# Patient Record
Sex: FEMALE | Race: WHITE | Employment: OTHER | URBAN - METROPOLITAN AREA
[De-identification: names, ages, dates, MRNs, and addresses within clinical notes are randomized per-mention and may not be internally consistent; named-entity substitution may affect disease eponyms.]

---

## 2017-05-11 ENCOUNTER — HOSPITAL ENCOUNTER (OUTPATIENT)
Dept: MAMMOGRAPHY | Age: 72
Discharge: HOME | End: 2017-05-11
Payer: MEDICARE

## 2017-05-11 DIAGNOSIS — Z13.9 SCREENING: ICD-10-CM

## 2017-05-11 PROCEDURE — 77063 BREAST TOMOSYNTHESIS BI: CPT

## 2018-05-16 ENCOUNTER — HOSPITAL ENCOUNTER (OUTPATIENT)
Dept: MAMMOGRAPHY | Age: 73
Discharge: HOME | End: 2018-05-18
Payer: MEDICARE

## 2018-05-16 DIAGNOSIS — Z12.31 ENCOUNTER FOR SCREENING MAMMOGRAM FOR BREAST CANCER: ICD-10-CM

## 2018-05-16 PROCEDURE — 77063 BREAST TOMOSYNTHESIS BI: CPT

## 2019-05-17 ENCOUNTER — HOSPITAL ENCOUNTER (OUTPATIENT)
Dept: MAMMOGRAPHY | Age: 74
Discharge: HOME | End: 2019-05-19
Payer: MEDICARE

## 2019-05-17 DIAGNOSIS — C50.911 MALIGNANT NEOPLASM OF RIGHT FEMALE BREAST, UNSPECIFIED ESTROGEN RECEPTOR STATUS, UNSPECIFIED SITE OF BREAST (HCC): ICD-10-CM

## 2019-05-17 DIAGNOSIS — Z12.31 ENCOUNTER FOR SCREENING MAMMOGRAM FOR MALIGNANT NEOPLASM OF BREAST: ICD-10-CM

## 2019-05-17 PROCEDURE — 77063 BREAST TOMOSYNTHESIS BI: CPT

## 2019-05-29 ENCOUNTER — HOSPITAL ENCOUNTER (OUTPATIENT)
Dept: MAMMOGRAPHY | Age: 74
Discharge: HOME | End: 2019-05-31
Payer: MEDICARE

## 2019-05-29 DIAGNOSIS — R92.1 BREAST CALCIFICATION, LEFT: ICD-10-CM

## 2019-05-29 PROCEDURE — G0279 TOMOSYNTHESIS, MAMMO: HCPCS

## 2019-06-05 ENCOUNTER — HOSPITAL ENCOUNTER (OUTPATIENT)
Dept: MAMMOGRAPHY | Age: 74
Discharge: HOME | End: 2019-06-07
Payer: MEDICARE

## 2019-06-05 VITALS — DIASTOLIC BLOOD PRESSURE: 73 MMHG | HEART RATE: 70 BPM | SYSTOLIC BLOOD PRESSURE: 118 MMHG

## 2019-06-05 DIAGNOSIS — R92.8 ABNORMAL MAMMOGRAM: ICD-10-CM

## 2019-06-05 DIAGNOSIS — Z98.890 STATUS POST LEFT BREAST BIOPSY: ICD-10-CM

## 2019-06-05 PROCEDURE — 19081 BX BREAST 1ST LESION STRTCTC: CPT

## 2019-06-05 PROCEDURE — 88360 TUMOR IMMUNOHISTOCHEM/MANUAL: CPT

## 2019-06-05 PROCEDURE — 88305 TISSUE EXAM BY PATHOLOGIST: CPT

## 2019-06-05 PROCEDURE — 77065 DX MAMMO INCL CAD UNI: CPT

## 2019-06-07 LAB — SURGICAL PATHOLOGY REPORT: NORMAL

## 2019-07-05 ENCOUNTER — HOSPITAL ENCOUNTER (OUTPATIENT)
Dept: PREADMISSION TESTING | Age: 74
Discharge: HOME | End: 2019-07-09
Payer: MEDICARE

## 2019-07-05 VITALS
OXYGEN SATURATION: 96 % | HEART RATE: 59 BPM | HEIGHT: 62 IN | WEIGHT: 190.04 LBS | RESPIRATION RATE: 16 BRPM | BODY MASS INDEX: 34.97 KG/M2

## 2019-07-05 LAB
ABSOLUTE EOS #: 0.18 K/UL (ref 0–0.44)
ABSOLUTE IMMATURE GRANULOCYTE: 0.02 K/UL (ref 0–0.3)
ABSOLUTE LYMPH #: 1.32 K/UL (ref 1.1–3.7)
ABSOLUTE MONO #: 0.42 K/UL (ref 0.1–1.2)
ANION GAP SERPL CALCULATED.3IONS-SCNC: 10 MMOL/L (ref 9–17)
BASOPHILS # BLD: 1 % (ref 0–2)
BASOPHILS ABSOLUTE: 0.04 K/UL (ref 0–0.2)
BUN BLDV-MCNC: 18 MG/DL (ref 8–23)
CHLORIDE BLD-SCNC: 103 MMOL/L (ref 98–107)
CO2: 27 MMOL/L (ref 20–31)
CREAT SERPL-MCNC: 0.82 MG/DL (ref 0.5–0.9)
DIFFERENTIAL TYPE: NORMAL
EOSINOPHILS RELATIVE PERCENT: 4 % (ref 1–4)
GFR AFRICAN AMERICAN: >60 ML/MIN
GFR NON-AFRICAN AMERICAN: >60 ML/MIN
GFR SERPL CREATININE-BSD FRML MDRD: NORMAL ML/MIN/{1.73_M2}
GFR SERPL CREATININE-BSD FRML MDRD: NORMAL ML/MIN/{1.73_M2}
HCT VFR BLD CALC: 42.2 % (ref 36.3–47.1)
HEMOGLOBIN: 13.4 G/DL (ref 11.9–15.1)
IMMATURE GRANULOCYTES: 0 %
LYMPHOCYTES # BLD: 28 % (ref 24–43)
MCH RBC QN AUTO: 29.9 PG (ref 25.2–33.5)
MCHC RBC AUTO-ENTMCNC: 31.8 G/DL (ref 28.4–34.8)
MCV RBC AUTO: 94.2 FL (ref 82.6–102.9)
MONOCYTES # BLD: 9 % (ref 3–12)
NRBC AUTOMATED: 0 PER 100 WBC
PDW BLD-RTO: 13.5 % (ref 11.8–14.4)
PLATELET # BLD: 212 K/UL (ref 138–453)
PLATELET ESTIMATE: NORMAL
PMV BLD AUTO: 11.1 FL (ref 8.1–13.5)
POTASSIUM SERPL-SCNC: 4.5 MMOL/L (ref 3.7–5.3)
RBC # BLD: 4.48 M/UL (ref 3.95–5.11)
RBC # BLD: NORMAL 10*6/UL
SEG NEUTROPHILS: 58 % (ref 36–65)
SEGMENTED NEUTROPHILS ABSOLUTE COUNT: 2.69 K/UL (ref 1.5–8.1)
SODIUM BLD-SCNC: 140 MMOL/L (ref 135–144)
WBC # BLD: 4.7 K/UL (ref 3.5–11.3)
WBC # BLD: NORMAL 10*3/UL

## 2019-07-05 PROCEDURE — 82565 ASSAY OF CREATININE: CPT

## 2019-07-05 PROCEDURE — 85025 COMPLETE CBC W/AUTO DIFF WBC: CPT

## 2019-07-05 PROCEDURE — 80051 ELECTROLYTE PANEL: CPT

## 2019-07-05 PROCEDURE — 84520 ASSAY OF UREA NITROGEN: CPT

## 2019-07-05 PROCEDURE — 36415 COLL VENOUS BLD VENIPUNCTURE: CPT

## 2019-07-05 RX ORDER — OMEPRAZOLE 20 MG/1
20 CAPSULE, DELAYED RELEASE ORAL DAILY
COMMUNITY

## 2019-07-05 RX ORDER — M-VIT,TX,IRON,MINS/CALC/FOLIC 27MG-0.4MG
1 TABLET ORAL DAILY
COMMUNITY

## 2019-07-05 RX ORDER — ASPIRIN 81 MG/1
162 TABLET ORAL NIGHTLY
COMMUNITY

## 2019-07-05 RX ORDER — PROPAFENONE HYDROCHLORIDE 150 MG/1
150 TABLET, FILM COATED ORAL EVERY 8 HOURS
COMMUNITY

## 2019-07-05 RX ORDER — AMOXICILLIN 500 MG
1 CAPSULE ORAL DAILY
COMMUNITY

## 2019-07-05 SDOH — HEALTH STABILITY: MENTAL HEALTH: HOW OFTEN DO YOU HAVE A DRINK CONTAINING ALCOHOL?: NEVER

## 2019-07-05 NOTE — PRE-PROCEDURE INSTRUCTIONS
permission from your physician. You will need to shower at home the night before surgery and the morning of surgery with a special soap called chlorhexidine gluconate (CHG*). *Not to be used by people allergic to Chlorhexidine Gluconate (CHG). Following these instructions will help you be sure that your skin is clean before surgery. Instructions on cleaning your skin before surgery: The night before your surgery:      You will need to shower with warm water (not hot) and the CHG soap.  Use a clean wash cloth and a clean towel. Have clean clothes available to put on after the shower.    First wash your hair with regular shampoo. Rinse your hair and body thoroughly to remove the shampoo. [facility] Wash your face with your regular soap or water only. Thoroughly rinse your body with warm water from the neck down.  Turn water off to prevent rinsing the soap off too soon.  With a clean wet washcloth and half of the CHG soap in the bottle, lather your entire body from the neck down. Do not use CHG soap near your eyes or ears to avoid injury to those areas.  Wash thoroughly, paying special attention to the area where your surgery will be performed.  Wash your body gently for five (5) minutes. Avoid scrubbing your skin too hard.  Turn the water back on and rinse your body thoroughly.  Pat yourself dry with a clean, soft towel. Do not apply lotion, cream or powder.  Dress with clean freshly washed clothes. The morning of surgery:     Repeat shower following steps above - using remaining half of CHG soap in bottle. Patient Instructions:    [facility] If you are having any type of anesthesia you are to have nothing to eat or drink after midnight the night before your surgery. This includes gum, mints, water or smoking or chewing tobacco.  The only exception to this is a small sip of water to take with any morning dose of heart, blood pressure, or seizure medications.   No alcoholic beverages for 24 hours prior to surgery.  Bring your eyeglasses and case with you. No contacts are to be worn the day of surgery. You also may bring your hearing aids.  If you are on C-PAP or Bi-PAP at home and plan on staying in the hospital overnight for your surgery please bring the machine with you. · Do not wear any jewelry or body piercings day of surgery. Also, NO lotion, perfume or deodorant to be used the day of surgery. No nail polish on the operative extremity (arm/leg surgeries)    ·   If you are staying overnight with us, please bring a small bag of personal items.  Please wear loose, comfortable clothing. If you are potentially going to have a cast or brace bring clothing that will fit over them.  In case of illness - If you have cold or flu like symptoms (high fever, runny nose, sore throat, cough, etc.) rash, nausea, vomiting, loose stools, and/or recent contact with someone who has a contagious disease (chicken pox, measles, etc.) Please call your doctor before coming to the hospital.     If your child is having surgery please make arrangements for any other children to be cared for at home on the day of surgery. Other children are not permitted in recovery room and we want you to be able to spend time with the patient. If other arrangements are not available then we suggest that you have a second adult to stay in the waiting room. Day of Surgery/Procedure:    As a patient at [facility] you can expect quality medical and nursing care that is centered on your individual needs. Our goal is to make your surgical experience as comfortable as possible    . Transportation After Your Surgery/Procedure: You will need a friend or family member to drive you home after your procedure.   Your  must be 25years of age or older and able to

## 2019-07-10 NOTE — PROGRESS NOTES
Dunia Benites from Dr. Dave Ferrer office called and stated that patient can have her IV placed in the left hand for for her Left breast Lumpectomy.

## 2019-07-15 ENCOUNTER — ANESTHESIA (OUTPATIENT)
Dept: OPERATING ROOM | Age: 74
End: 2019-07-15
Payer: MEDICARE

## 2019-07-15 ENCOUNTER — HOSPITAL ENCOUNTER (OUTPATIENT)
Dept: MAMMOGRAPHY | Age: 74
Discharge: HOME | End: 2019-07-17
Payer: MEDICARE

## 2019-07-15 ENCOUNTER — ANESTHESIA EVENT (OUTPATIENT)
Dept: OPERATING ROOM | Age: 74
End: 2019-07-15
Payer: MEDICARE

## 2019-07-15 ENCOUNTER — APPOINTMENT (OUTPATIENT)
Dept: MAMMOGRAPHY | Age: 74
End: 2019-07-15
Attending: SURGERY
Payer: MEDICARE

## 2019-07-15 ENCOUNTER — HOSPITAL ENCOUNTER (OUTPATIENT)
Age: 74
Setting detail: OUTPATIENT SURGERY
Discharge: HOME | End: 2019-07-15
Attending: SURGERY | Admitting: SURGERY
Payer: MEDICARE

## 2019-07-15 VITALS
HEART RATE: 64 BPM | DIASTOLIC BLOOD PRESSURE: 70 MMHG | TEMPERATURE: 98.2 F | BODY MASS INDEX: 35.32 KG/M2 | RESPIRATION RATE: 23 BRPM | WEIGHT: 190 LBS | SYSTOLIC BLOOD PRESSURE: 158 MMHG | OXYGEN SATURATION: 95 %

## 2019-07-15 VITALS — OXYGEN SATURATION: 99 % | TEMPERATURE: 97.3 F | DIASTOLIC BLOOD PRESSURE: 63 MMHG | SYSTOLIC BLOOD PRESSURE: 116 MMHG

## 2019-07-15 DIAGNOSIS — D05.12 DUCTAL CARCINOMA IN SITU OF LEFT BREAST: ICD-10-CM

## 2019-07-15 PROCEDURE — 7100000010 HC PHASE II RECOVERY - FIRST 15 MIN: Performed by: SURGERY

## 2019-07-15 PROCEDURE — 2720000010 HC SURG SUPPLY STERILE: Performed by: SURGERY

## 2019-07-15 PROCEDURE — 6360000002 HC RX W HCPCS: Performed by: NURSE ANESTHETIST, CERTIFIED REGISTERED

## 2019-07-15 PROCEDURE — 88305 TISSUE EXAM BY PATHOLOGIST: CPT

## 2019-07-15 PROCEDURE — 7100000000 HC PACU RECOVERY - FIRST 15 MIN: Performed by: SURGERY

## 2019-07-15 PROCEDURE — 3600000003 HC SURGERY LEVEL 3 BASE: Performed by: SURGERY

## 2019-07-15 PROCEDURE — 19281 PERQ DEVICE BREAST 1ST IMAG: CPT

## 2019-07-15 PROCEDURE — 2500000003 HC RX 250 WO HCPCS

## 2019-07-15 PROCEDURE — 7100000001 HC PACU RECOVERY - ADDTL 15 MIN: Performed by: SURGERY

## 2019-07-15 PROCEDURE — 2500000003 HC RX 250 WO HCPCS: Performed by: NURSE ANESTHETIST, CERTIFIED REGISTERED

## 2019-07-15 PROCEDURE — 3700000000 HC ANESTHESIA ATTENDED CARE: Performed by: SURGERY

## 2019-07-15 PROCEDURE — A4648 IMPLANTABLE TISSUE MARKER: HCPCS | Performed by: SURGERY

## 2019-07-15 PROCEDURE — 2580000003 HC RX 258: Performed by: ANESTHESIOLOGY

## 2019-07-15 PROCEDURE — 2709999900 HC NON-CHARGEABLE SUPPLY: Performed by: SURGERY

## 2019-07-15 PROCEDURE — 88307 TISSUE EXAM BY PATHOLOGIST: CPT

## 2019-07-15 PROCEDURE — 76098 X-RAY EXAM SURGICAL SPECIMEN: CPT

## 2019-07-15 PROCEDURE — 2580000003 HC RX 258: Performed by: NURSE ANESTHETIST, CERTIFIED REGISTERED

## 2019-07-15 PROCEDURE — 7100000011 HC PHASE II RECOVERY - ADDTL 15 MIN: Performed by: SURGERY

## 2019-07-15 PROCEDURE — 3700000001 HC ADD 15 MINUTES (ANESTHESIA): Performed by: SURGERY

## 2019-07-15 PROCEDURE — 3600000013 HC SURGERY LEVEL 3 ADDTL 15MIN: Performed by: SURGERY

## 2019-07-15 PROCEDURE — 2500000003 HC RX 250 WO HCPCS: Performed by: SURGERY

## 2019-07-15 DEVICE — MARKER TISS W3XL4CM RADIOGRAPHIC BIOABSRB SPCR HLD RADPQ: Type: IMPLANTABLE DEVICE | Site: BREAST | Status: FUNCTIONAL

## 2019-07-15 RX ORDER — FENTANYL CITRATE 50 UG/ML
25 INJECTION, SOLUTION INTRAMUSCULAR; INTRAVENOUS EVERY 5 MIN PRN
Status: DISCONTINUED | OUTPATIENT
Start: 2019-07-15 | End: 2019-07-15 | Stop reason: HOSPADM

## 2019-07-15 RX ORDER — NEOSTIGMINE METHYLSULFATE 5 MG/5 ML
SYRINGE (ML) INTRAVENOUS PRN
Status: DISCONTINUED | OUTPATIENT
Start: 2019-07-15 | End: 2019-07-15 | Stop reason: SDUPTHER

## 2019-07-15 RX ORDER — ONDANSETRON 2 MG/ML
4 INJECTION INTRAMUSCULAR; INTRAVENOUS
Status: DISCONTINUED | OUTPATIENT
Start: 2019-07-15 | End: 2019-07-15 | Stop reason: HOSPADM

## 2019-07-15 RX ORDER — PROPOFOL 10 MG/ML
INJECTION, EMULSION INTRAVENOUS PRN
Status: DISCONTINUED | OUTPATIENT
Start: 2019-07-15 | End: 2019-07-15 | Stop reason: SDUPTHER

## 2019-07-15 RX ORDER — GLYCOPYRROLATE 1 MG/5 ML
SYRINGE (ML) INTRAVENOUS PRN
Status: DISCONTINUED | OUTPATIENT
Start: 2019-07-15 | End: 2019-07-15 | Stop reason: SDUPTHER

## 2019-07-15 RX ORDER — FENTANYL CITRATE 50 UG/ML
INJECTION, SOLUTION INTRAMUSCULAR; INTRAVENOUS PRN
Status: DISCONTINUED | OUTPATIENT
Start: 2019-07-15 | End: 2019-07-15 | Stop reason: SDUPTHER

## 2019-07-15 RX ORDER — SODIUM CHLORIDE, SODIUM LACTATE, POTASSIUM CHLORIDE, CALCIUM CHLORIDE 600; 310; 30; 20 MG/100ML; MG/100ML; MG/100ML; MG/100ML
INJECTION, SOLUTION INTRAVENOUS CONTINUOUS
Status: DISCONTINUED | OUTPATIENT
Start: 2019-07-15 | End: 2019-07-15 | Stop reason: HOSPADM

## 2019-07-15 RX ORDER — DEXAMETHASONE SODIUM PHOSPHATE 10 MG/ML
INJECTION INTRAMUSCULAR; INTRAVENOUS PRN
Status: DISCONTINUED | OUTPATIENT
Start: 2019-07-15 | End: 2019-07-15 | Stop reason: SDUPTHER

## 2019-07-15 RX ORDER — BUPIVACAINE HYDROCHLORIDE 2.5 MG/ML
INJECTION, SOLUTION EPIDURAL; INFILTRATION; INTRACAUDAL PRN
Status: DISCONTINUED | OUTPATIENT
Start: 2019-07-15 | End: 2019-07-15 | Stop reason: ALTCHOICE

## 2019-07-15 RX ORDER — HYDROMORPHONE HCL 110MG/55ML
0.5 PATIENT CONTROLLED ANALGESIA SYRINGE INTRAVENOUS EVERY 5 MIN PRN
Status: DISCONTINUED | OUTPATIENT
Start: 2019-07-15 | End: 2019-07-15 | Stop reason: HOSPADM

## 2019-07-15 RX ORDER — PROMETHAZINE HYDROCHLORIDE 25 MG/ML
6.25 INJECTION, SOLUTION INTRAMUSCULAR; INTRAVENOUS
Status: DISCONTINUED | OUTPATIENT
Start: 2019-07-15 | End: 2019-07-15 | Stop reason: HOSPADM

## 2019-07-15 RX ORDER — CEFAZOLIN SODIUM 1 G/3ML
INJECTION, POWDER, FOR SOLUTION INTRAMUSCULAR; INTRAVENOUS PRN
Status: DISCONTINUED | OUTPATIENT
Start: 2019-07-15 | End: 2019-07-15 | Stop reason: SDUPTHER

## 2019-07-15 RX ORDER — LABETALOL HYDROCHLORIDE 5 MG/ML
5 INJECTION, SOLUTION INTRAVENOUS EVERY 10 MIN PRN
Status: DISCONTINUED | OUTPATIENT
Start: 2019-07-15 | End: 2019-07-15 | Stop reason: HOSPADM

## 2019-07-15 RX ORDER — OXYCODONE HYDROCHLORIDE AND ACETAMINOPHEN 5; 325 MG/1; MG/1
1 TABLET ORAL PRN
Status: DISCONTINUED | OUTPATIENT
Start: 2019-07-15 | End: 2019-07-15 | Stop reason: HOSPADM

## 2019-07-15 RX ORDER — HYDRALAZINE HYDROCHLORIDE 20 MG/ML
5 INJECTION INTRAMUSCULAR; INTRAVENOUS EVERY 10 MIN PRN
Status: DISCONTINUED | OUTPATIENT
Start: 2019-07-15 | End: 2019-07-15 | Stop reason: HOSPADM

## 2019-07-15 RX ORDER — ROCURONIUM BROMIDE 10 MG/ML
INJECTION, SOLUTION INTRAVENOUS PRN
Status: DISCONTINUED | OUTPATIENT
Start: 2019-07-15 | End: 2019-07-15 | Stop reason: SDUPTHER

## 2019-07-15 RX ORDER — OXYCODONE HYDROCHLORIDE AND ACETAMINOPHEN 5; 325 MG/1; MG/1
2 TABLET ORAL PRN
Status: DISCONTINUED | OUTPATIENT
Start: 2019-07-15 | End: 2019-07-15 | Stop reason: HOSPADM

## 2019-07-15 RX ORDER — OXYCODONE HYDROCHLORIDE AND ACETAMINOPHEN 5; 325 MG/1; MG/1
1 TABLET ORAL EVERY 4 HOURS PRN
Qty: 20 TABLET | Refills: 0 | Status: SHIPPED | OUTPATIENT
Start: 2019-07-15 | End: 2019-07-22

## 2019-07-15 RX ORDER — LIDOCAINE HYDROCHLORIDE 20 MG/ML
INJECTION, SOLUTION EPIDURAL; INFILTRATION; INTRACAUDAL; PERINEURAL PRN
Status: DISCONTINUED | OUTPATIENT
Start: 2019-07-15 | End: 2019-07-15 | Stop reason: SDUPTHER

## 2019-07-15 RX ORDER — SODIUM CHLORIDE, SODIUM LACTATE, POTASSIUM CHLORIDE, CALCIUM CHLORIDE 600; 310; 30; 20 MG/100ML; MG/100ML; MG/100ML; MG/100ML
INJECTION, SOLUTION INTRAVENOUS CONTINUOUS PRN
Status: DISCONTINUED | OUTPATIENT
Start: 2019-07-15 | End: 2019-07-15 | Stop reason: SDUPTHER

## 2019-07-15 RX ORDER — ONDANSETRON 2 MG/ML
INJECTION INTRAMUSCULAR; INTRAVENOUS PRN
Status: DISCONTINUED | OUTPATIENT
Start: 2019-07-15 | End: 2019-07-15 | Stop reason: SDUPTHER

## 2019-07-15 RX ORDER — LIDOCAINE HYDROCHLORIDE 10 MG/ML
5 INJECTION, SOLUTION INFILTRATION; PERINEURAL ONCE
Status: DISCONTINUED | OUTPATIENT
Start: 2019-07-15 | End: 2019-07-15 | Stop reason: HOSPADM

## 2019-07-15 RX ADMIN — ONDANSETRON 4 MG: 2 INJECTION, SOLUTION INTRAMUSCULAR; INTRAVENOUS at 15:30

## 2019-07-15 RX ADMIN — Medication 0.6 MG: at 15:30

## 2019-07-15 RX ADMIN — CEFAZOLIN 2000 MG: 1 INJECTION, POWDER, FOR SOLUTION INTRAMUSCULAR; INTRAVENOUS at 14:27

## 2019-07-15 RX ADMIN — ROCURONIUM BROMIDE 50 MG: 10 INJECTION, SOLUTION INTRAVENOUS at 14:11

## 2019-07-15 RX ADMIN — PROPOFOL 110 MG: 10 INJECTION, EMULSION INTRAVENOUS at 14:11

## 2019-07-15 RX ADMIN — DEXAMETHASONE SODIUM PHOSPHATE 10 MG: 10 INJECTION INTRAMUSCULAR; INTRAVENOUS at 14:19

## 2019-07-15 RX ADMIN — SODIUM CHLORIDE, POTASSIUM CHLORIDE, SODIUM LACTATE AND CALCIUM CHLORIDE: 600; 310; 30; 20 INJECTION, SOLUTION INTRAVENOUS at 11:02

## 2019-07-15 RX ADMIN — SODIUM CHLORIDE, POTASSIUM CHLORIDE, SODIUM LACTATE AND CALCIUM CHLORIDE: 600; 310; 30; 20 INJECTION, SOLUTION INTRAVENOUS at 15:51

## 2019-07-15 RX ADMIN — SODIUM CHLORIDE, POTASSIUM CHLORIDE, SODIUM LACTATE AND CALCIUM CHLORIDE: 600; 310; 30; 20 INJECTION, SOLUTION INTRAVENOUS at 14:05

## 2019-07-15 RX ADMIN — Medication 4 MG: at 15:30

## 2019-07-15 RX ADMIN — Medication 100 MCG: at 14:11

## 2019-07-15 RX ADMIN — LIDOCAINE HYDROCHLORIDE 70 MG: 20 INJECTION, SOLUTION EPIDURAL; INFILTRATION; INTRACAUDAL; PERINEURAL at 14:11

## 2019-07-15 ASSESSMENT — PULMONARY FUNCTION TESTS
PIF_VALUE: 20
PIF_VALUE: 20
PIF_VALUE: 2
PIF_VALUE: 18
PIF_VALUE: 2
PIF_VALUE: 21
PIF_VALUE: 20
PIF_VALUE: 22
PIF_VALUE: 21
PIF_VALUE: 21
PIF_VALUE: 1
PIF_VALUE: 21
PIF_VALUE: 19
PIF_VALUE: 21
PIF_VALUE: 19
PIF_VALUE: 18
PIF_VALUE: 18
PIF_VALUE: 0
PIF_VALUE: 21
PIF_VALUE: 21
PIF_VALUE: 20
PIF_VALUE: 20
PIF_VALUE: 21
PIF_VALUE: 20
PIF_VALUE: 21
PIF_VALUE: 18
PIF_VALUE: 21
PIF_VALUE: 19
PIF_VALUE: 21
PIF_VALUE: 18
PIF_VALUE: 14
PIF_VALUE: 18
PIF_VALUE: 20
PIF_VALUE: 19
PIF_VALUE: 21
PIF_VALUE: 1
PIF_VALUE: 20
PIF_VALUE: 18
PIF_VALUE: 18
PIF_VALUE: 22
PIF_VALUE: 20
PIF_VALUE: 19
PIF_VALUE: 21
PIF_VALUE: 18
PIF_VALUE: 19
PIF_VALUE: 19
PIF_VALUE: 21
PIF_VALUE: 19
PIF_VALUE: 21
PIF_VALUE: 19
PIF_VALUE: 18
PIF_VALUE: 21
PIF_VALUE: 1
PIF_VALUE: 21
PIF_VALUE: 18
PIF_VALUE: 21
PIF_VALUE: 21
PIF_VALUE: 18
PIF_VALUE: 19
PIF_VALUE: 0
PIF_VALUE: 1
PIF_VALUE: 2
PIF_VALUE: 25
PIF_VALUE: 19
PIF_VALUE: 25
PIF_VALUE: 20
PIF_VALUE: 20
PIF_VALUE: 21
PIF_VALUE: 19
PIF_VALUE: 20
PIF_VALUE: 1
PIF_VALUE: 32
PIF_VALUE: 21
PIF_VALUE: 18
PIF_VALUE: 22
PIF_VALUE: 42
PIF_VALUE: 19
PIF_VALUE: 21
PIF_VALUE: 21
PIF_VALUE: 9
PIF_VALUE: 20
PIF_VALUE: 19
PIF_VALUE: 21
PIF_VALUE: 17
PIF_VALUE: 18
PIF_VALUE: 0
PIF_VALUE: 21
PIF_VALUE: 18
PIF_VALUE: 18
PIF_VALUE: 13
PIF_VALUE: 21
PIF_VALUE: 20
PIF_VALUE: 18
PIF_VALUE: 2
PIF_VALUE: 21
PIF_VALUE: 21

## 2019-07-15 ASSESSMENT — PAIN SCALES - GENERAL
PAINLEVEL_OUTOF10: 0

## 2019-07-15 ASSESSMENT — PAIN - FUNCTIONAL ASSESSMENT: PAIN_FUNCTIONAL_ASSESSMENT: 0-10

## 2019-07-15 ASSESSMENT — PAIN DESCRIPTION - DESCRIPTORS: DESCRIPTORS: ACHING;DULL

## 2019-07-15 NOTE — ANESTHESIA PRE PROCEDURE
Department of Anesthesiology  Preprocedure Note       Name:  Shanon Amaya   Age:  75 y.o.  :  1945                                          MRN:  [id number]         Date:  7/15/2019      Surgeon: Ashley Ramos):  Carlos Jain MD    Procedure: LEFT BREAST LUMPECTOMY WITH WIRE LOCALIZATION @   1130    WITH PLACEMENT OF BIOZORB MARKER (Left )    Medications prior to admission:   Prior to Admission medications    Medication Sig Start Date End Date Taking?  Authorizing Provider   omeprazole (PRILOSEC) 20 MG delayed release capsule Take 20 mg by mouth daily   Yes Historical Provider, MD   propafenone (RYTHMOL) 150 MG tablet Take 150 mg by mouth every 8 hours   Yes Historical Provider, MD   Multiple Vitamins-Minerals (ICAPS AREDS 2 PO) Take 1 tablet by mouth 2 times daily    Historical Provider, MD   Omega-3 Fatty Acids (FISH OIL) 1200 MG CAPS Take 1 capsule by mouth daily    Historical Provider, MD   Multiple Vitamins-Minerals (THERAPEUTIC MULTIVITAMIN-MINERALS) tablet Take 1 tablet by mouth daily    Historical Provider, MD   aspirin 81 MG EC tablet Take 162 mg by mouth nightly    Historical Provider, MD       Current medications:    Current Facility-Administered Medications   Medication Dose Route Frequency Provider Last Rate Last Dose    lactated ringers infusion   Intravenous Continuous [place] Resides,  mL/hr at 07/15/19 1102      lidocaine 1 % injection 5 mL  5 mL Intradermal Once Saran Diamond Ring, DO           Allergies:  No Known Allergies    Problem List:    Patient Active Problem List   Diagnosis Code   (none) - all problems resolved or deleted       Past Medical History:        Diagnosis Date    A-fib [facility])     h/[facility]     breast,skin    GERD (gastroesophageal reflux disease)     Macular degeneration     Thyroid disease     off medication yrs ago with no problems since (written: 2019)       Past Surgical History:        Procedure 64.6 03/30/2013       HCG (If Applicable): No results found for: PREGTESTUR, PREGSERUM, HCG, HCGQUANT     ABGs: No results found for: PHART, PO2ART, CPX7JNI, RLL3UOQ, BEART, Z9JBKAAW     Type & Screen (If Applicable):  No results found for: LABABO, [address]    Anesthesia Evaluation  Patient summary reviewed and Nursing notes reviewed  Airway: Mallampati: II  TM distance: >3 FB   Neck ROM: full  Mouth opening: > = 3 FB Dental:          Pulmonary:normal exam        (-) COPD and asthma                           Cardiovascular:  Exercise tolerance: no interval change,   (+) dysrhythmias: atrial fibrillation,     (-) hypertension, past MI and CAD        Rate: normal                    Neuro/Psych:      (-) seizures           GI/Hepatic/Renal:   (+) GERD:,           Endo/Other:    (+) : arthritis:., malignancy/cancer. Abdominal:           Vascular:                                        Anesthesia Plan      general     ASA 3       Induction: intravenous. Anesthetic plan and risks discussed with patient. Plan discussed with CRNA.     Attending anesthesiologist reviewed and agrees with Pre Eval content              Ella Prince DO   7/15/2019

## 2019-07-17 LAB — SURGICAL PATHOLOGY REPORT: NORMAL

## 2019-08-02 ENCOUNTER — ANESTHESIA EVENT (OUTPATIENT)
Dept: OPERATING ROOM | Age: 74
End: 2019-08-02
Payer: MEDICARE

## 2019-08-05 ENCOUNTER — HOSPITAL ENCOUNTER (OUTPATIENT)
Age: 74
Setting detail: OUTPATIENT SURGERY
Discharge: HOME | End: 2019-08-05
Attending: SURGERY | Admitting: SURGERY
Payer: MEDICARE

## 2019-08-05 ENCOUNTER — ANESTHESIA (OUTPATIENT)
Dept: OPERATING ROOM | Age: 74
End: 2019-08-05
Payer: MEDICARE

## 2019-08-05 VITALS
OXYGEN SATURATION: 96 % | BODY MASS INDEX: 35.2 KG/M2 | WEIGHT: 191.3 LBS | HEART RATE: 62 BPM | SYSTOLIC BLOOD PRESSURE: 147 MMHG | TEMPERATURE: 96.8 F | DIASTOLIC BLOOD PRESSURE: 75 MMHG | RESPIRATION RATE: 17 BRPM | HEIGHT: 62 IN

## 2019-08-05 VITALS — OXYGEN SATURATION: 95 % | DIASTOLIC BLOOD PRESSURE: 52 MMHG | SYSTOLIC BLOOD PRESSURE: 96 MMHG | TEMPERATURE: 96.6 F

## 2019-08-05 PROCEDURE — 6360000002 HC RX W HCPCS: Performed by: NURSE ANESTHETIST, CERTIFIED REGISTERED

## 2019-08-05 PROCEDURE — 2500000003 HC RX 250 WO HCPCS: Performed by: NURSE ANESTHETIST, CERTIFIED REGISTERED

## 2019-08-05 PROCEDURE — 7100000011 HC PHASE II RECOVERY - ADDTL 15 MIN: Performed by: SURGERY

## 2019-08-05 PROCEDURE — 7100000010 HC PHASE II RECOVERY - FIRST 15 MIN: Performed by: SURGERY

## 2019-08-05 PROCEDURE — 2580000003 HC RX 258: Performed by: ANESTHESIOLOGY

## 2019-08-05 PROCEDURE — 6360000002 HC RX W HCPCS: Performed by: SURGERY

## 2019-08-05 PROCEDURE — 3600000012 HC SURGERY LEVEL 2 ADDTL 15MIN: Performed by: SURGERY

## 2019-08-05 PROCEDURE — 2500000003 HC RX 250 WO HCPCS: Performed by: SURGERY

## 2019-08-05 PROCEDURE — 2580000003 HC RX 258: Performed by: SURGERY

## 2019-08-05 PROCEDURE — 88305 TISSUE EXAM BY PATHOLOGIST: CPT

## 2019-08-05 PROCEDURE — 2709999900 HC NON-CHARGEABLE SUPPLY: Performed by: SURGERY

## 2019-08-05 PROCEDURE — 2720000010 HC SURG SUPPLY STERILE: Performed by: SURGERY

## 2019-08-05 PROCEDURE — 3700000000 HC ANESTHESIA ATTENDED CARE: Performed by: SURGERY

## 2019-08-05 PROCEDURE — 3600000002 HC SURGERY LEVEL 2 BASE: Performed by: SURGERY

## 2019-08-05 PROCEDURE — 3700000001 HC ADD 15 MINUTES (ANESTHESIA): Performed by: SURGERY

## 2019-08-05 RX ORDER — LIDOCAINE HYDROCHLORIDE 10 MG/ML
1 INJECTION, SOLUTION EPIDURAL; INFILTRATION; INTRACAUDAL; PERINEURAL
Status: DISCONTINUED | OUTPATIENT
Start: 2019-08-06 | End: 2019-08-05 | Stop reason: HOSPADM

## 2019-08-05 RX ORDER — GLYCOPYRROLATE 1 MG/5 ML
SYRINGE (ML) INTRAVENOUS PRN
Status: DISCONTINUED | OUTPATIENT
Start: 2019-08-05 | End: 2019-08-05 | Stop reason: SDUPTHER

## 2019-08-05 RX ORDER — SODIUM CHLORIDE 0.9 % (FLUSH) 0.9 %
10 SYRINGE (ML) INJECTION PRN
Status: DISCONTINUED | OUTPATIENT
Start: 2019-08-05 | End: 2019-08-05 | Stop reason: HOSPADM

## 2019-08-05 RX ORDER — BUPIVACAINE HYDROCHLORIDE 2.5 MG/ML
INJECTION, SOLUTION INFILTRATION; PERINEURAL PRN
Status: DISCONTINUED | OUTPATIENT
Start: 2019-08-05 | End: 2019-08-05 | Stop reason: ALTCHOICE

## 2019-08-05 RX ORDER — SODIUM CHLORIDE 0.9 % (FLUSH) 0.9 %
10 SYRINGE (ML) INJECTION EVERY 12 HOURS SCHEDULED
Status: DISCONTINUED | OUTPATIENT
Start: 2019-08-05 | End: 2019-08-05 | Stop reason: HOSPADM

## 2019-08-05 RX ORDER — FENTANYL CITRATE 50 UG/ML
50 INJECTION, SOLUTION INTRAMUSCULAR; INTRAVENOUS EVERY 5 MIN PRN
Status: DISCONTINUED | OUTPATIENT
Start: 2019-08-05 | End: 2019-08-05 | Stop reason: HOSPADM

## 2019-08-05 RX ORDER — DEXAMETHASONE SODIUM PHOSPHATE 10 MG/ML
INJECTION INTRAMUSCULAR; INTRAVENOUS PRN
Status: DISCONTINUED | OUTPATIENT
Start: 2019-08-05 | End: 2019-08-05 | Stop reason: SDUPTHER

## 2019-08-05 RX ORDER — FENTANYL CITRATE 50 UG/ML
INJECTION, SOLUTION INTRAMUSCULAR; INTRAVENOUS PRN
Status: DISCONTINUED | OUTPATIENT
Start: 2019-08-05 | End: 2019-08-05 | Stop reason: SDUPTHER

## 2019-08-05 RX ORDER — CEFAZOLIN SODIUM 2 G/50ML
SOLUTION INTRAVENOUS PRN
Status: DISCONTINUED | OUTPATIENT
Start: 2019-08-05 | End: 2019-08-05 | Stop reason: SDUPTHER

## 2019-08-05 RX ORDER — HYDROMORPHONE HCL 110MG/55ML
0.5 PATIENT CONTROLLED ANALGESIA SYRINGE INTRAVENOUS EVERY 5 MIN PRN
Status: DISCONTINUED | OUTPATIENT
Start: 2019-08-05 | End: 2019-08-05 | Stop reason: HOSPADM

## 2019-08-05 RX ORDER — PROPOFOL 10 MG/ML
INJECTION, EMULSION INTRAVENOUS PRN
Status: DISCONTINUED | OUTPATIENT
Start: 2019-08-05 | End: 2019-08-05 | Stop reason: SDUPTHER

## 2019-08-05 RX ORDER — HYDROMORPHONE HCL 110MG/55ML
0.25 PATIENT CONTROLLED ANALGESIA SYRINGE INTRAVENOUS EVERY 5 MIN PRN
Status: DISCONTINUED | OUTPATIENT
Start: 2019-08-05 | End: 2019-08-05 | Stop reason: HOSPADM

## 2019-08-05 RX ORDER — FENTANYL CITRATE 50 UG/ML
25 INJECTION, SOLUTION INTRAMUSCULAR; INTRAVENOUS EVERY 5 MIN PRN
Status: DISCONTINUED | OUTPATIENT
Start: 2019-08-05 | End: 2019-08-05 | Stop reason: HOSPADM

## 2019-08-05 RX ORDER — SODIUM CHLORIDE, SODIUM LACTATE, POTASSIUM CHLORIDE, CALCIUM CHLORIDE 600; 310; 30; 20 MG/100ML; MG/100ML; MG/100ML; MG/100ML
INJECTION, SOLUTION INTRAVENOUS CONTINUOUS
Status: DISCONTINUED | OUTPATIENT
Start: 2019-08-06 | End: 2019-08-05 | Stop reason: HOSPADM

## 2019-08-05 RX ORDER — ONDANSETRON 2 MG/ML
4 INJECTION INTRAMUSCULAR; INTRAVENOUS
Status: DISCONTINUED | OUTPATIENT
Start: 2019-08-05 | End: 2019-08-05 | Stop reason: HOSPADM

## 2019-08-05 RX ORDER — SODIUM CHLORIDE 9 MG/ML
INJECTION, SOLUTION INTRAVENOUS CONTINUOUS
Status: DISCONTINUED | OUTPATIENT
Start: 2019-08-06 | End: 2019-08-05 | Stop reason: HOSPADM

## 2019-08-05 RX ORDER — LIDOCAINE HYDROCHLORIDE 20 MG/ML
INJECTION, SOLUTION EPIDURAL; INFILTRATION; INTRACAUDAL; PERINEURAL PRN
Status: DISCONTINUED | OUTPATIENT
Start: 2019-08-05 | End: 2019-08-05 | Stop reason: SDUPTHER

## 2019-08-05 RX ORDER — ONDANSETRON 2 MG/ML
INJECTION INTRAMUSCULAR; INTRAVENOUS PRN
Status: DISCONTINUED | OUTPATIENT
Start: 2019-08-05 | End: 2019-08-05 | Stop reason: SDUPTHER

## 2019-08-05 RX ADMIN — PHENYLEPHRINE HYDROCHLORIDE 200 MCG: 10 INJECTION INTRAVENOUS at 15:03

## 2019-08-05 RX ADMIN — DEXAMETHASONE SODIUM PHOSPHATE 10 MG: 10 INJECTION INTRAMUSCULAR; INTRAVENOUS at 14:25

## 2019-08-05 RX ADMIN — Medication 0.2 MG: at 14:21

## 2019-08-05 RX ADMIN — LIDOCAINE HYDROCHLORIDE 100 MG: 20 INJECTION, SOLUTION EPIDURAL; INFILTRATION; INTRACAUDAL; PERINEURAL at 14:09

## 2019-08-05 RX ADMIN — PROPOFOL 50 MG: 10 INJECTION, EMULSION INTRAVENOUS at 14:12

## 2019-08-05 RX ADMIN — PHENYLEPHRINE HYDROCHLORIDE 200 MCG: 10 INJECTION INTRAVENOUS at 14:39

## 2019-08-05 RX ADMIN — CEFAZOLIN SODIUM 2 G: 2 SOLUTION INTRAVENOUS at 14:20

## 2019-08-05 RX ADMIN — Medication 50 MCG: at 14:35

## 2019-08-05 RX ADMIN — ONDANSETRON 4 MG: 2 INJECTION, SOLUTION INTRAMUSCULAR; INTRAVENOUS at 14:25

## 2019-08-05 RX ADMIN — Medication 25 MCG: at 14:17

## 2019-08-05 RX ADMIN — SODIUM CHLORIDE, POTASSIUM CHLORIDE, SODIUM LACTATE AND CALCIUM CHLORIDE: 600; 310; 30; 20 INJECTION, SOLUTION INTRAVENOUS at 15:17

## 2019-08-05 RX ADMIN — SODIUM CHLORIDE, POTASSIUM CHLORIDE, SODIUM LACTATE AND CALCIUM CHLORIDE: 600; 310; 30; 20 INJECTION, SOLUTION INTRAVENOUS at 12:33

## 2019-08-05 RX ADMIN — PHENYLEPHRINE HYDROCHLORIDE 200 MCG: 10 INJECTION INTRAVENOUS at 14:25

## 2019-08-05 RX ADMIN — PROPOFOL 150 MG: 10 INJECTION, EMULSION INTRAVENOUS at 14:09

## 2019-08-05 RX ADMIN — Medication 25 MCG: at 14:30

## 2019-08-05 ASSESSMENT — PULMONARY FUNCTION TESTS
PIF_VALUE: 12
PIF_VALUE: 0
PIF_VALUE: 12
PIF_VALUE: 3
PIF_VALUE: 3
PIF_VALUE: 12
PIF_VALUE: 1
PIF_VALUE: 13
PIF_VALUE: 3
PIF_VALUE: 12
PIF_VALUE: 2
PIF_VALUE: 12
PIF_VALUE: 13
PIF_VALUE: 2
PIF_VALUE: 12
PIF_VALUE: 12
PIF_VALUE: 19
PIF_VALUE: 12
PIF_VALUE: 13
PIF_VALUE: 21
PIF_VALUE: 12
PIF_VALUE: 13
PIF_VALUE: 13
PIF_VALUE: 12
PIF_VALUE: 12
PIF_VALUE: 0
PIF_VALUE: 4
PIF_VALUE: 2
PIF_VALUE: 2
PIF_VALUE: 12
PIF_VALUE: 1
PIF_VALUE: 12
PIF_VALUE: 13
PIF_VALUE: 12
PIF_VALUE: 13
PIF_VALUE: 12
PIF_VALUE: 12
PIF_VALUE: 2
PIF_VALUE: 14
PIF_VALUE: 2
PIF_VALUE: 12
PIF_VALUE: 6
PIF_VALUE: 12
PIF_VALUE: 2
PIF_VALUE: 12
PIF_VALUE: 13
PIF_VALUE: 12
PIF_VALUE: 2
PIF_VALUE: 12
PIF_VALUE: 1
PIF_VALUE: 12
PIF_VALUE: 9
PIF_VALUE: 12
PIF_VALUE: 0
PIF_VALUE: 12
PIF_VALUE: 0
PIF_VALUE: 13
PIF_VALUE: 12
PIF_VALUE: 2
PIF_VALUE: 12
PIF_VALUE: 20
PIF_VALUE: 3
PIF_VALUE: 12
PIF_VALUE: 9
PIF_VALUE: 3
PIF_VALUE: 12
PIF_VALUE: 21
PIF_VALUE: 12
PIF_VALUE: 3

## 2019-08-05 ASSESSMENT — PAIN SCALES - GENERAL
PAINLEVEL_OUTOF10: 0
PAINLEVEL_OUTOF10: 0
PAINLEVEL_OUTOF10: 2
PAINLEVEL_OUTOF10: 0

## 2019-08-05 ASSESSMENT — PAIN DESCRIPTION - DESCRIPTORS: DESCRIPTORS: ACHING

## 2019-08-05 ASSESSMENT — PAIN DESCRIPTION - ORIENTATION: ORIENTATION: RIGHT

## 2019-08-05 ASSESSMENT — PAIN DESCRIPTION - FREQUENCY: FREQUENCY: INTERMITTENT

## 2019-08-05 ASSESSMENT — PAIN DESCRIPTION - LOCATION: LOCATION: KNEE

## 2019-08-05 ASSESSMENT — PAIN DESCRIPTION - PAIN TYPE: TYPE: ACUTE PAIN

## 2019-08-05 NOTE — H&P
distress. General Appearance:  alert, well appearing, and in no acute distress  Mental status: oriented to person, place, and time with normal affect  Head:  normocephalic, atraumatic. Eye: no icterus, redness, pupils equal and reactive, extraocular eye movements intact, conjunctiva clear  Ear: normal external ear, no discharge, hearing intact  Nose:  no drainage noted  Mouth: mucous membranes moist  Neck: supple, no carotid bruits, thyroid not palpable  Heart: Heart sounds are normal.  HR 60 regular rate and rhythm without murmur, gallop or rub. Lungs: Normal respiratory effort with good air exchange, unlabored and clear to auscultation without wheezes or rales bilaterally   Abdomen: soft, round, nontender, nondistended with bowel sounds . Neurologic: There are no new focal motor or sensory deficits, normal muscle tone and bulk, no abnormal sensation, normal speech, cranial nerves II through XII grossly intact  Skin: No gross lesions, rashes, bruising or bleeding on exposed skin area  Extremities:  Multiple superficial varicosities bilateral lower extremities. No ecchymotic areas peripheral pulses palpable, no pedal edema or calf pain with palpation  Psych: normal affect       Labs:  No results for input(s): HGB, HCT, WBC, MCV, PLT, NA, K, CL, CO2, BUN, CREATININE, GLUCOSE, INR, PROTIME, APTT, AST, ALT, LABALBU, HCG in the last 720 hours.     GENEVIEVE Franco-BC  Electronically signed 8/5/2019 at 12:49 PM

## 2019-08-07 LAB — SURGICAL PATHOLOGY REPORT: NORMAL

## 2019-09-24 ENCOUNTER — ANESTHESIA EVENT (OUTPATIENT)
Dept: OPERATING ROOM | Age: 74
End: 2019-09-24
Payer: MEDICARE

## 2019-09-25 ENCOUNTER — HOSPITAL ENCOUNTER (OUTPATIENT)
Age: 74
Setting detail: OBSERVATION
Discharge: HOME | End: 2019-09-26
Attending: SURGERY | Admitting: SURGERY
Payer: MEDICARE

## 2019-09-25 ENCOUNTER — HOSPITAL ENCOUNTER (OUTPATIENT)
Dept: NUCLEAR MEDICINE | Age: 74
Discharge: HOME | End: 2019-09-27
Payer: MEDICARE

## 2019-09-25 ENCOUNTER — ANESTHESIA (OUTPATIENT)
Dept: OPERATING ROOM | Age: 74
End: 2019-09-25
Payer: MEDICARE

## 2019-09-25 VITALS — OXYGEN SATURATION: 96 % | SYSTOLIC BLOOD PRESSURE: 167 MMHG | DIASTOLIC BLOOD PRESSURE: 70 MMHG | TEMPERATURE: 96.8 F

## 2019-09-25 DIAGNOSIS — D05.12 INTRADUCTAL CARCINOMA IN SITU OF LEFT BREAST: ICD-10-CM

## 2019-09-25 PROBLEM — D05.90 BREAST CANCER IN SITU: Status: ACTIVE | Noted: 2019-09-25

## 2019-09-25 LAB
ABSOLUTE EOS #: 0.17 K/UL (ref 0–0.44)
ABSOLUTE IMMATURE GRANULOCYTE: 0.01 K/UL (ref 0–0.3)
ABSOLUTE LYMPH #: 1.5 K/UL (ref 1.1–3.7)
ABSOLUTE MONO #: 0.41 K/UL (ref 0.1–1.2)
ANION GAP SERPL CALCULATED.3IONS-SCNC: 12 MMOL/L (ref 9–17)
BASOPHILS # BLD: 1 % (ref 0–2)
BASOPHILS ABSOLUTE: 0.03 K/UL (ref 0–0.2)
BUN BLDV-MCNC: 17 MG/DL (ref 8–23)
CHLORIDE BLD-SCNC: 104 MMOL/L (ref 98–107)
CO2: 24 MMOL/L (ref 20–31)
CREAT SERPL-MCNC: 0.85 MG/DL (ref 0.5–0.9)
DIFFERENTIAL TYPE: NORMAL
EOSINOPHILS RELATIVE PERCENT: 4 % (ref 1–4)
GFR AFRICAN AMERICAN: >60 ML/MIN
GFR NON-AFRICAN AMERICAN: >60 ML/MIN
GFR SERPL CREATININE-BSD FRML MDRD: NORMAL ML/MIN/{1.73_M2}
GFR SERPL CREATININE-BSD FRML MDRD: NORMAL ML/MIN/{1.73_M2}
HCT VFR BLD CALC: 41 % (ref 36.3–47.1)
HEMOGLOBIN: 13.5 G/DL (ref 11.9–15.1)
IMMATURE GRANULOCYTES: 0 %
LYMPHOCYTES # BLD: 35 % (ref 24–43)
MCH RBC QN AUTO: 30.5 PG (ref 25.2–33.5)
MCHC RBC AUTO-ENTMCNC: 32.9 G/DL (ref 28.4–34.8)
MCV RBC AUTO: 92.6 FL (ref 82.6–102.9)
MONOCYTES # BLD: 10 % (ref 3–12)
NRBC AUTOMATED: 0 PER 100 WBC
PDW BLD-RTO: 13.4 % (ref 11.8–14.4)
PLATELET # BLD: 212 K/UL (ref 138–453)
PLATELET ESTIMATE: NORMAL
PMV BLD AUTO: 11.1 FL (ref 8.1–13.5)
POTASSIUM SERPL-SCNC: 4.1 MMOL/L (ref 3.7–5.3)
RBC # BLD: 4.43 M/UL (ref 3.95–5.11)
RBC # BLD: NORMAL 10*6/UL
SEG NEUTROPHILS: 50 % (ref 36–65)
SEGMENTED NEUTROPHILS ABSOLUTE COUNT: 2.2 K/UL (ref 1.5–8.1)
SODIUM BLD-SCNC: 140 MMOL/L (ref 135–144)
WBC # BLD: 4.3 K/UL (ref 3.5–11.3)
WBC # BLD: NORMAL 10*3/UL

## 2019-09-25 PROCEDURE — 2500000003 HC RX 250 WO HCPCS: Performed by: SURGERY

## 2019-09-25 PROCEDURE — 7100000000 HC PACU RECOVERY - FIRST 15 MIN: Performed by: SURGERY

## 2019-09-25 PROCEDURE — 3430000000 HC RX DIAGNOSTIC RADIOPHARMACEUTICAL

## 2019-09-25 PROCEDURE — 3430000000 HC RX DIAGNOSTIC RADIOPHARMACEUTICAL: Performed by: SURGERY

## 2019-09-25 PROCEDURE — 7100000001 HC PACU RECOVERY - ADDTL 15 MIN: Performed by: SURGERY

## 2019-09-25 PROCEDURE — 85025 COMPLETE CBC W/AUTO DIFF WBC: CPT

## 2019-09-25 PROCEDURE — 2580000003 HC RX 258: Performed by: ANESTHESIOLOGY

## 2019-09-25 PROCEDURE — 88307 TISSUE EXAM BY PATHOLOGIST: CPT

## 2019-09-25 PROCEDURE — 2720000010 HC SURG SUPPLY STERILE: Performed by: SURGERY

## 2019-09-25 PROCEDURE — 6370000000 HC RX 637 (ALT 250 FOR IP): Performed by: SURGERY

## 2019-09-25 PROCEDURE — 88332 PATH CONSLTJ SURG EA ADD BLK: CPT

## 2019-09-25 PROCEDURE — 3600000013 HC SURGERY LEVEL 3 ADDTL 15MIN: Performed by: SURGERY

## 2019-09-25 PROCEDURE — 2500000003 HC RX 250 WO HCPCS: Performed by: NURSE ANESTHETIST, CERTIFIED REGISTERED

## 2019-09-25 PROCEDURE — 78195 LYMPH SYSTEM IMAGING: CPT

## 2019-09-25 PROCEDURE — 80051 ELECTROLYTE PANEL: CPT

## 2019-09-25 PROCEDURE — 3600000003 HC SURGERY LEVEL 3 BASE: Performed by: SURGERY

## 2019-09-25 PROCEDURE — 82565 ASSAY OF CREATININE: CPT

## 2019-09-25 PROCEDURE — 3700000000 HC ANESTHESIA ATTENDED CARE: Performed by: SURGERY

## 2019-09-25 PROCEDURE — 88305 TISSUE EXAM BY PATHOLOGIST: CPT

## 2019-09-25 PROCEDURE — 84520 ASSAY OF UREA NITROGEN: CPT

## 2019-09-25 PROCEDURE — 2709999900 HC NON-CHARGEABLE SUPPLY: Performed by: SURGERY

## 2019-09-25 PROCEDURE — A9520 TC99 TILMANOCEPT DIAG 0.5MCI: HCPCS | Performed by: SURGERY

## 2019-09-25 PROCEDURE — 6360000002 HC RX W HCPCS: Performed by: NURSE ANESTHETIST, CERTIFIED REGISTERED

## 2019-09-25 PROCEDURE — C1751 CATH, INF, PER/CENT/MIDLINE: HCPCS | Performed by: SURGERY

## 2019-09-25 PROCEDURE — 2580000003 HC RX 258: Performed by: SURGERY

## 2019-09-25 PROCEDURE — 3700000001 HC ADD 15 MINUTES (ANESTHESIA): Performed by: SURGERY

## 2019-09-25 PROCEDURE — 88331 PATH CONSLTJ SURG 1 BLK 1SPC: CPT

## 2019-09-25 PROCEDURE — G0378 HOSPITAL OBSERVATION PER HR: HCPCS

## 2019-09-25 PROCEDURE — 6360000002 HC RX W HCPCS: Performed by: SURGERY

## 2019-09-25 PROCEDURE — C2626 INFUSION PUMP, NON-PROG,TEMP: HCPCS | Performed by: SURGERY

## 2019-09-25 PROCEDURE — 88304 TISSUE EXAM BY PATHOLOGIST: CPT

## 2019-09-25 RX ORDER — ONDANSETRON 2 MG/ML
4 INJECTION INTRAMUSCULAR; INTRAVENOUS EVERY 6 HOURS PRN
Status: DISCONTINUED | OUTPATIENT
Start: 2019-09-25 | End: 2019-09-25

## 2019-09-25 RX ORDER — ONDANSETRON 4 MG/1
4 TABLET, ORALLY DISINTEGRATING ORAL EVERY 6 HOURS PRN
Status: DISCONTINUED | OUTPATIENT
Start: 2019-09-25 | End: 2019-09-26 | Stop reason: HOSPADM

## 2019-09-25 RX ORDER — OXYCODONE HYDROCHLORIDE AND ACETAMINOPHEN 5; 325 MG/1; MG/1
1 TABLET ORAL
Status: DISCONTINUED | OUTPATIENT
Start: 2019-09-25 | End: 2019-09-25 | Stop reason: HOSPADM

## 2019-09-25 RX ORDER — ACETAMINOPHEN 325 MG/1
650 TABLET ORAL ONCE
Status: COMPLETED | OUTPATIENT
Start: 2019-09-25 | End: 2019-09-25

## 2019-09-25 RX ORDER — ONDANSETRON 2 MG/ML
INJECTION INTRAMUSCULAR; INTRAVENOUS PRN
Status: DISCONTINUED | OUTPATIENT
Start: 2019-09-25 | End: 2019-09-25 | Stop reason: SDUPTHER

## 2019-09-25 RX ORDER — SODIUM CHLORIDE 9 MG/ML
INJECTION, SOLUTION INTRAVENOUS CONTINUOUS
Status: DISCONTINUED | OUTPATIENT
Start: 2019-09-25 | End: 2019-09-25

## 2019-09-25 RX ORDER — LIDOCAINE HYDROCHLORIDE 20 MG/ML
INJECTION, SOLUTION EPIDURAL; INFILTRATION; INTRACAUDAL; PERINEURAL PRN
Status: DISCONTINUED | OUTPATIENT
Start: 2019-09-25 | End: 2019-09-25 | Stop reason: SDUPTHER

## 2019-09-25 RX ORDER — ACETAMINOPHEN 325 MG/1
650 TABLET ORAL EVERY 6 HOURS
Status: DISCONTINUED | OUTPATIENT
Start: 2019-09-25 | End: 2019-09-26 | Stop reason: HOSPADM

## 2019-09-25 RX ORDER — SODIUM CHLORIDE 0.9 % (FLUSH) 0.9 %
10 SYRINGE (ML) INJECTION EVERY 12 HOURS SCHEDULED
Status: DISCONTINUED | OUTPATIENT
Start: 2019-09-25 | End: 2019-09-26 | Stop reason: HOSPADM

## 2019-09-25 RX ORDER — PROPOFOL 10 MG/ML
INJECTION, EMULSION INTRAVENOUS PRN
Status: DISCONTINUED | OUTPATIENT
Start: 2019-09-25 | End: 2019-09-25 | Stop reason: SDUPTHER

## 2019-09-25 RX ORDER — EPHEDRINE SULFATE/0.9% NACL/PF 50 MG/5 ML
SYRINGE (ML) INTRAVENOUS PRN
Status: DISCONTINUED | OUTPATIENT
Start: 2019-09-25 | End: 2019-09-25 | Stop reason: SDUPTHER

## 2019-09-25 RX ORDER — OXYCODONE HYDROCHLORIDE 5 MG/1
5 TABLET ORAL EVERY 4 HOURS PRN
Status: DISCONTINUED | OUTPATIENT
Start: 2019-09-25 | End: 2019-09-26 | Stop reason: HOSPADM

## 2019-09-25 RX ORDER — BUPIVACAINE HYDROCHLORIDE 2.5 MG/ML
INJECTION, SOLUTION INFILTRATION; PERINEURAL PRN
Status: DISCONTINUED | OUTPATIENT
Start: 2019-09-25 | End: 2019-09-25 | Stop reason: ALTCHOICE

## 2019-09-25 RX ORDER — SODIUM CHLORIDE, SODIUM LACTATE, POTASSIUM CHLORIDE, CALCIUM CHLORIDE 600; 310; 30; 20 MG/100ML; MG/100ML; MG/100ML; MG/100ML
INJECTION, SOLUTION INTRAVENOUS CONTINUOUS
Status: DISCONTINUED | OUTPATIENT
Start: 2019-09-25 | End: 2019-09-25

## 2019-09-25 RX ORDER — SODIUM CHLORIDE 0.9 % (FLUSH) 0.9 %
10 SYRINGE (ML) INJECTION EVERY 12 HOURS SCHEDULED
Status: DISCONTINUED | OUTPATIENT
Start: 2019-09-25 | End: 2019-09-25 | Stop reason: HOSPADM

## 2019-09-25 RX ORDER — FENTANYL CITRATE 50 UG/ML
25 INJECTION, SOLUTION INTRAMUSCULAR; INTRAVENOUS EVERY 5 MIN PRN
Status: DISCONTINUED | OUTPATIENT
Start: 2019-09-25 | End: 2019-09-25 | Stop reason: HOSPADM

## 2019-09-25 RX ORDER — LIDOCAINE 40 MG/G
CREAM TOPICAL
Status: COMPLETED | OUTPATIENT
Start: 2019-09-25 | End: 2019-09-25

## 2019-09-25 RX ORDER — OMEPRAZOLE 20 MG/1
20 CAPSULE, DELAYED RELEASE ORAL DAILY
Status: DISCONTINUED | OUTPATIENT
Start: 2019-09-25 | End: 2019-09-25

## 2019-09-25 RX ORDER — FENTANYL CITRATE 50 UG/ML
INJECTION, SOLUTION INTRAMUSCULAR; INTRAVENOUS PRN
Status: DISCONTINUED | OUTPATIENT
Start: 2019-09-25 | End: 2019-09-25 | Stop reason: SDUPTHER

## 2019-09-25 RX ORDER — SODIUM CHLORIDE 0.9 % (FLUSH) 0.9 %
10 SYRINGE (ML) INJECTION PRN
Status: DISCONTINUED | OUTPATIENT
Start: 2019-09-25 | End: 2019-09-26 | Stop reason: HOSPADM

## 2019-09-25 RX ORDER — LIDOCAINE 40 MG/G
CREAM TOPICAL
Status: CANCELLED | OUTPATIENT
Start: 2019-09-25

## 2019-09-25 RX ORDER — OXYCODONE HYDROCHLORIDE 5 MG/1
10 TABLET ORAL EVERY 4 HOURS PRN
Status: DISCONTINUED | OUTPATIENT
Start: 2019-09-25 | End: 2019-09-26 | Stop reason: HOSPADM

## 2019-09-25 RX ORDER — ONDANSETRON 2 MG/ML
4 INJECTION INTRAMUSCULAR; INTRAVENOUS
Status: DISCONTINUED | OUTPATIENT
Start: 2019-09-25 | End: 2019-09-25 | Stop reason: HOSPADM

## 2019-09-25 RX ORDER — DEXAMETHASONE SODIUM PHOSPHATE 10 MG/ML
INJECTION INTRAMUSCULAR; INTRAVENOUS PRN
Status: DISCONTINUED | OUTPATIENT
Start: 2019-09-25 | End: 2019-09-25 | Stop reason: SDUPTHER

## 2019-09-25 RX ORDER — PANTOPRAZOLE SODIUM 40 MG/1
40 TABLET, DELAYED RELEASE ORAL
Status: DISCONTINUED | OUTPATIENT
Start: 2019-09-26 | End: 2019-09-26 | Stop reason: HOSPADM

## 2019-09-25 RX ORDER — ONDANSETRON 2 MG/ML
4 INJECTION INTRAMUSCULAR; INTRAVENOUS EVERY 6 HOURS PRN
Status: DISCONTINUED | OUTPATIENT
Start: 2019-09-25 | End: 2019-09-26 | Stop reason: HOSPADM

## 2019-09-25 RX ORDER — BUPIVACAINE HYDROCHLORIDE 5 MG/ML
INJECTION, SOLUTION EPIDURAL; INTRACAUDAL PRN
Status: DISCONTINUED | OUTPATIENT
Start: 2019-09-25 | End: 2019-09-25 | Stop reason: ALTCHOICE

## 2019-09-25 RX ORDER — SODIUM CHLORIDE 0.9 % (FLUSH) 0.9 %
10 SYRINGE (ML) INJECTION PRN
Status: DISCONTINUED | OUTPATIENT
Start: 2019-09-25 | End: 2019-09-25 | Stop reason: HOSPADM

## 2019-09-25 RX ORDER — BUPIVACAINE HYDROCHLORIDE 2.5 MG/ML
INJECTION, SOLUTION EPIDURAL; INFILTRATION; INTRACAUDAL PRN
Status: DISCONTINUED | OUTPATIENT
Start: 2019-09-25 | End: 2019-09-25 | Stop reason: ALTCHOICE

## 2019-09-25 RX ORDER — SUCCINYLCHOLINE/SOD CL,ISO/PF 100 MG/5ML
SYRINGE (ML) INTRAVENOUS PRN
Status: DISCONTINUED | OUTPATIENT
Start: 2019-09-25 | End: 2019-09-25 | Stop reason: SDUPTHER

## 2019-09-25 RX ORDER — PROPAFENONE HYDROCHLORIDE 150 MG/1
150 TABLET, FILM COATED ORAL EVERY 8 HOURS
Status: DISCONTINUED | OUTPATIENT
Start: 2019-09-25 | End: 2019-09-26 | Stop reason: HOSPADM

## 2019-09-25 RX ORDER — DEXTROSE, SODIUM CHLORIDE, AND POTASSIUM CHLORIDE 5; .9; .15 G/100ML; G/100ML; G/100ML
INJECTION INTRAVENOUS CONTINUOUS
Status: DISCONTINUED | OUTPATIENT
Start: 2019-09-25 | End: 2019-09-26 | Stop reason: HOSPADM

## 2019-09-25 RX ORDER — LIDOCAINE HYDROCHLORIDE 10 MG/ML
1 INJECTION, SOLUTION EPIDURAL; INFILTRATION; INTRACAUDAL; PERINEURAL
Status: DISCONTINUED | OUTPATIENT
Start: 2019-09-25 | End: 2019-09-25 | Stop reason: HOSPADM

## 2019-09-25 RX ADMIN — Medication 10 MG: at 14:58

## 2019-09-25 RX ADMIN — PROPOFOL 180 MG: 10 INJECTION, EMULSION INTRAVENOUS at 14:30

## 2019-09-25 RX ADMIN — FENTANYL CITRATE 100 MCG: 50 INJECTION, SOLUTION INTRAMUSCULAR; INTRAVENOUS at 14:30

## 2019-09-25 RX ADMIN — Medication 10 MG: at 16:27

## 2019-09-25 RX ADMIN — SODIUM CHLORIDE, POTASSIUM CHLORIDE, SODIUM LACTATE AND CALCIUM CHLORIDE: 600; 310; 30; 20 INJECTION, SOLUTION INTRAVENOUS at 14:24

## 2019-09-25 RX ADMIN — FENTANYL CITRATE 50 MCG: 50 INJECTION, SOLUTION INTRAMUSCULAR; INTRAVENOUS at 14:49

## 2019-09-25 RX ADMIN — ACETAMINOPHEN 650 MG: 325 TABLET ORAL at 18:58

## 2019-09-25 RX ADMIN — POTASSIUM CHLORIDE, DEXTROSE MONOHYDRATE AND SODIUM CHLORIDE: 150; 5; 900 INJECTION, SOLUTION INTRAVENOUS at 20:43

## 2019-09-25 RX ADMIN — LIDOCAINE HYDROCHLORIDE 100 MG: 20 INJECTION, SOLUTION EPIDURAL; INFILTRATION; INTRACAUDAL; PERINEURAL at 14:30

## 2019-09-25 RX ADMIN — PROPOFOL 30 MG: 10 INJECTION, EMULSION INTRAVENOUS at 15:28

## 2019-09-25 RX ADMIN — PROPOFOL 20 MG: 10 INJECTION, EMULSION INTRAVENOUS at 14:49

## 2019-09-25 RX ADMIN — SODIUM CHLORIDE, POTASSIUM CHLORIDE, SODIUM LACTATE AND CALCIUM CHLORIDE: 600; 310; 30; 20 INJECTION, SOLUTION INTRAVENOUS at 11:20

## 2019-09-25 RX ADMIN — TILMANOCEPT 0.6 MILLICURIE: KIT at 12:00

## 2019-09-25 RX ADMIN — ONDANSETRON 4 MG: 2 INJECTION, SOLUTION INTRAMUSCULAR; INTRAVENOUS at 17:21

## 2019-09-25 RX ADMIN — FENTANYL CITRATE 50 MCG: 50 INJECTION, SOLUTION INTRAMUSCULAR; INTRAVENOUS at 16:21

## 2019-09-25 RX ADMIN — LIDOCAINE 4%: 4 CREAM TOPICAL at 11:09

## 2019-09-25 RX ADMIN — SODIUM CHLORIDE, POTASSIUM CHLORIDE, SODIUM LACTATE AND CALCIUM CHLORIDE: 600; 310; 30; 20 INJECTION, SOLUTION INTRAVENOUS at 17:27

## 2019-09-25 RX ADMIN — PROPOFOL 30 MG: 10 INJECTION, EMULSION INTRAVENOUS at 16:21

## 2019-09-25 RX ADMIN — ACETAMINOPHEN 650 MG: 325 TABLET ORAL at 20:43

## 2019-09-25 RX ADMIN — FENTANYL CITRATE 50 MCG: 50 INJECTION, SOLUTION INTRAMUSCULAR; INTRAVENOUS at 15:28

## 2019-09-25 RX ADMIN — Medication 100 MG: at 14:31

## 2019-09-25 RX ADMIN — DEXAMETHASONE SODIUM PHOSPHATE 10 MG: 10 INJECTION INTRAMUSCULAR; INTRAVENOUS at 16:34

## 2019-09-25 RX ADMIN — SODIUM CHLORIDE, POTASSIUM CHLORIDE, SODIUM LACTATE AND CALCIUM CHLORIDE: 600; 310; 30; 20 INJECTION, SOLUTION INTRAVENOUS at 15:30

## 2019-09-25 RX ADMIN — DEXTROSE MONOHYDRATE 2 G: 50 INJECTION, SOLUTION INTRAVENOUS at 14:45

## 2019-09-25 ASSESSMENT — PULMONARY FUNCTION TESTS
PIF_VALUE: 26
PIF_VALUE: 28
PIF_VALUE: 24
PIF_VALUE: 25
PIF_VALUE: 24
PIF_VALUE: 27
PIF_VALUE: 25
PIF_VALUE: 24
PIF_VALUE: 24
PIF_VALUE: 23
PIF_VALUE: 25
PIF_VALUE: 26
PIF_VALUE: 23
PIF_VALUE: 2
PIF_VALUE: 25
PIF_VALUE: 25
PIF_VALUE: 0
PIF_VALUE: 25
PIF_VALUE: 25
PIF_VALUE: 23
PIF_VALUE: 24
PIF_VALUE: 25
PIF_VALUE: 23
PIF_VALUE: 25
PIF_VALUE: 0
PIF_VALUE: 25
PIF_VALUE: 2
PIF_VALUE: 25
PIF_VALUE: 0
PIF_VALUE: 27
PIF_VALUE: 25
PIF_VALUE: 25
PIF_VALUE: 24
PIF_VALUE: 20
PIF_VALUE: 24
PIF_VALUE: 26
PIF_VALUE: 24
PIF_VALUE: 25
PIF_VALUE: 26
PIF_VALUE: 26
PIF_VALUE: 25
PIF_VALUE: 25
PIF_VALUE: 1
PIF_VALUE: 25
PIF_VALUE: 2
PIF_VALUE: 26
PIF_VALUE: 24
PIF_VALUE: 25
PIF_VALUE: 25
PIF_VALUE: 23
PIF_VALUE: 24
PIF_VALUE: 25
PIF_VALUE: 24
PIF_VALUE: 23
PIF_VALUE: 25
PIF_VALUE: 24
PIF_VALUE: 0
PIF_VALUE: 24
PIF_VALUE: 29
PIF_VALUE: 25
PIF_VALUE: 25
PIF_VALUE: 24
PIF_VALUE: 25
PIF_VALUE: 23
PIF_VALUE: 1
PIF_VALUE: 24
PIF_VALUE: 23
PIF_VALUE: 24
PIF_VALUE: 22
PIF_VALUE: 1
PIF_VALUE: 24
PIF_VALUE: 25
PIF_VALUE: 24
PIF_VALUE: 25
PIF_VALUE: 26
PIF_VALUE: 24
PIF_VALUE: 24
PIF_VALUE: 25
PIF_VALUE: 24
PIF_VALUE: 24
PIF_VALUE: 25
PIF_VALUE: 26
PIF_VALUE: 25
PIF_VALUE: 24
PIF_VALUE: 1
PIF_VALUE: 25
PIF_VALUE: 23
PIF_VALUE: 25
PIF_VALUE: 23
PIF_VALUE: 24
PIF_VALUE: 1
PIF_VALUE: 24
PIF_VALUE: 24
PIF_VALUE: 25
PIF_VALUE: 24
PIF_VALUE: 24
PIF_VALUE: 26
PIF_VALUE: 25
PIF_VALUE: 24
PIF_VALUE: 24
PIF_VALUE: 25
PIF_VALUE: 24
PIF_VALUE: 25
PIF_VALUE: 24
PIF_VALUE: 25
PIF_VALUE: 2
PIF_VALUE: 1
PIF_VALUE: 25
PIF_VALUE: 1
PIF_VALUE: 25
PIF_VALUE: 24
PIF_VALUE: 25
PIF_VALUE: 25
PIF_VALUE: 26
PIF_VALUE: 25
PIF_VALUE: 2
PIF_VALUE: 24
PIF_VALUE: 24
PIF_VALUE: 25
PIF_VALUE: 25
PIF_VALUE: 26
PIF_VALUE: 27
PIF_VALUE: 24
PIF_VALUE: 26
PIF_VALUE: 25
PIF_VALUE: 2
PIF_VALUE: 2
PIF_VALUE: 24
PIF_VALUE: 29
PIF_VALUE: 25
PIF_VALUE: 2
PIF_VALUE: 0
PIF_VALUE: 25
PIF_VALUE: 24
PIF_VALUE: 23
PIF_VALUE: 24
PIF_VALUE: 25
PIF_VALUE: 2
PIF_VALUE: 24
PIF_VALUE: 25
PIF_VALUE: 26
PIF_VALUE: 24
PIF_VALUE: 26
PIF_VALUE: 25
PIF_VALUE: 26
PIF_VALUE: 24
PIF_VALUE: 25
PIF_VALUE: 26
PIF_VALUE: 25
PIF_VALUE: 2
PIF_VALUE: 29
PIF_VALUE: 24
PIF_VALUE: 24
PIF_VALUE: 26
PIF_VALUE: 25
PIF_VALUE: 25
PIF_VALUE: 24
PIF_VALUE: 25
PIF_VALUE: 24
PIF_VALUE: 25
PIF_VALUE: 1
PIF_VALUE: 25
PIF_VALUE: 24
PIF_VALUE: 1
PIF_VALUE: 26
PIF_VALUE: 24
PIF_VALUE: 25
PIF_VALUE: 25
PIF_VALUE: 24
PIF_VALUE: 24
PIF_VALUE: 26
PIF_VALUE: 25
PIF_VALUE: 20
PIF_VALUE: 25
PIF_VALUE: 25
PIF_VALUE: 28
PIF_VALUE: 1
PIF_VALUE: 24
PIF_VALUE: 24
PIF_VALUE: 22
PIF_VALUE: 1
PIF_VALUE: 25
PIF_VALUE: 23
PIF_VALUE: 26
PIF_VALUE: 37
PIF_VALUE: 26

## 2019-09-25 ASSESSMENT — PAIN - FUNCTIONAL ASSESSMENT: PAIN_FUNCTIONAL_ASSESSMENT: 0-10

## 2019-09-25 ASSESSMENT — PAIN SCALES - GENERAL
PAINLEVEL_OUTOF10: 0
PAINLEVEL_OUTOF10: 7
PAINLEVEL_OUTOF10: 0

## 2019-09-26 VITALS
BODY MASS INDEX: 36.42 KG/M2 | WEIGHT: 197.9 LBS | HEIGHT: 62 IN | OXYGEN SATURATION: 95 % | RESPIRATION RATE: 15 BRPM | SYSTOLIC BLOOD PRESSURE: 131 MMHG | DIASTOLIC BLOOD PRESSURE: 65 MMHG | TEMPERATURE: 97.9 F | HEART RATE: 63 BPM

## 2019-09-26 LAB
ANION GAP SERPL CALCULATED.3IONS-SCNC: 11 MMOL/L (ref 9–17)
BUN BLDV-MCNC: 14 MG/DL (ref 8–23)
BUN/CREAT BLD: 18 (ref 9–20)
CALCIUM SERPL-MCNC: 8.7 MG/DL (ref 8.6–10.4)
CHLORIDE BLD-SCNC: 106 MMOL/L (ref 98–107)
CO2: 24 MMOL/L (ref 20–31)
CREAT SERPL-MCNC: 0.79 MG/DL (ref 0.5–0.9)
GFR AFRICAN AMERICAN: >60 ML/MIN
GFR NON-AFRICAN AMERICAN: >60 ML/MIN
GFR SERPL CREATININE-BSD FRML MDRD: ABNORMAL ML/MIN/{1.73_M2}
GFR SERPL CREATININE-BSD FRML MDRD: ABNORMAL ML/MIN/{1.73_M2}
GLUCOSE BLD-MCNC: 159 MG/DL (ref 70–99)
HCT VFR BLD CALC: 36.5 % (ref 36.3–47.1)
HEMOGLOBIN: 11.7 G/DL (ref 11.9–15.1)
MCH RBC QN AUTO: 30.4 PG (ref 25.2–33.5)
MCHC RBC AUTO-ENTMCNC: 32.1 G/DL (ref 28.4–34.8)
MCV RBC AUTO: 94.8 FL (ref 82.6–102.9)
NRBC AUTOMATED: 0 PER 100 WBC
PDW BLD-RTO: 13.4 % (ref 11.8–14.4)
PLATELET # BLD: 195 K/UL (ref 138–453)
PMV BLD AUTO: 11.8 FL (ref 8.1–13.5)
POTASSIUM SERPL-SCNC: 4.5 MMOL/L (ref 3.7–5.3)
RBC # BLD: 3.85 M/UL (ref 3.95–5.11)
SODIUM BLD-SCNC: 141 MMOL/L (ref 135–144)
WBC # BLD: 6.9 K/UL (ref 3.5–11.3)

## 2019-09-26 PROCEDURE — 6370000000 HC RX 637 (ALT 250 FOR IP): Performed by: SURGERY

## 2019-09-26 PROCEDURE — 85027 COMPLETE CBC AUTOMATED: CPT

## 2019-09-26 PROCEDURE — 80048 BASIC METABOLIC PNL TOTAL CA: CPT

## 2019-09-26 PROCEDURE — 36415 COLL VENOUS BLD VENIPUNCTURE: CPT

## 2019-09-26 PROCEDURE — G0378 HOSPITAL OBSERVATION PER HR: HCPCS

## 2019-09-26 PROCEDURE — 2500000003 HC RX 250 WO HCPCS: Performed by: SURGERY

## 2019-09-26 RX ADMIN — ACETAMINOPHEN 650 MG: 325 TABLET ORAL at 08:37

## 2019-09-26 RX ADMIN — POTASSIUM CHLORIDE, DEXTROSE MONOHYDRATE AND SODIUM CHLORIDE: 150; 5; 900 INJECTION, SOLUTION INTRAVENOUS at 08:42

## 2019-09-26 RX ADMIN — PANTOPRAZOLE SODIUM 40 MG: 40 TABLET, DELAYED RELEASE ORAL at 08:37

## 2019-09-26 RX ADMIN — ACETAMINOPHEN 650 MG: 325 TABLET ORAL at 03:10

## 2019-09-26 RX ADMIN — PROPAFENONE HYDROCHLORIDE 150 MG: 150 TABLET, FILM COATED ORAL at 13:39

## 2019-09-26 ASSESSMENT — PAIN DESCRIPTION - PROGRESSION

## 2019-09-26 ASSESSMENT — PAIN DESCRIPTION - ONSET: ONSET: ON-GOING

## 2019-09-26 ASSESSMENT — PAIN DESCRIPTION - ORIENTATION: ORIENTATION: LEFT

## 2019-09-26 ASSESSMENT — PAIN SCALES - GENERAL
PAINLEVEL_OUTOF10: 5
PAINLEVEL_OUTOF10: 2

## 2019-09-26 ASSESSMENT — PAIN DESCRIPTION - FREQUENCY: FREQUENCY: INTERMITTENT

## 2019-09-26 ASSESSMENT — PAIN - FUNCTIONAL ASSESSMENT: PAIN_FUNCTIONAL_ASSESSMENT: ACTIVITIES ARE NOT PREVENTED

## 2019-09-26 ASSESSMENT — PAIN DESCRIPTION - DESCRIPTORS: DESCRIPTORS: TENDER;ACHING

## 2019-09-26 ASSESSMENT — PAIN DESCRIPTION - LOCATION: LOCATION: BREAST

## 2019-09-26 ASSESSMENT — PAIN DESCRIPTION - PAIN TYPE: TYPE: SURGICAL PAIN

## 2019-09-27 LAB — SURGICAL PATHOLOGY REPORT: NORMAL

## 2020-06-01 ENCOUNTER — HOSPITAL ENCOUNTER (OUTPATIENT)
Facility: MEDICAL CENTER | Age: 75
End: 2020-06-01
Payer: MEDICARE

## 2020-06-02 ENCOUNTER — HOSPITAL ENCOUNTER (OUTPATIENT)
Dept: MAMMOGRAPHY | Age: 75
Discharge: HOME | End: 2020-06-04
Payer: MEDICARE

## 2020-06-02 PROCEDURE — 77063 BREAST TOMOSYNTHESIS BI: CPT

## 2020-06-04 ENCOUNTER — INITIAL CONSULT (OUTPATIENT)
Dept: ONCOLOGY | Age: 75
End: 2020-06-04
Payer: MEDICARE

## 2020-06-04 PROCEDURE — 96040 PR GENETIC COUNSELING, EACH 30 MIN: CPT | Performed by: GENETIC COUNSELOR, MS

## 2020-06-04 NOTE — PROGRESS NOTES
3 [facility] Counseling Program   Hereditary Cancer Risk Assessment     Name: Adria Pyle   YOB: 1945   Date of Consultation: 6/4/20     Ms. Pyle was seen at the [place] [address] for genetic counseling on 6/4/20. Ms. Javier Marmolejo was referred by Dr. Chelly Cuellar due to her personal and family history of cancer. Ms. Pyle's daughter was also present at the appointment. PERSONAL HISTORY   Ms. Javier Marmolejo is a 67 y.o.  female with a personal history of bilateral breast cancer. At age 60, she was diagnosed with invasive ductal carcinoma of the right breast. She underwent lumpectomy, chemotherapy and radiation therapy. 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 Ms. Javier Marmolejo reports menarche at age 14, first child at age 24, and is post menopausal following a hysterectomy and bilateral salpingo oophorectomy at age 63. FAMILY HISTORY  Ms. Javier Marmolejo has one son (45y) and two daughters (48 and 53y). She had one son who passed away at age 40 from sepsis. Ms. Javier Marmolejo had two full brothers and two full sisters. One brother passed away at age 54 from esophageal cancer and one sister passed away from lung cancer. Ms. Javier Marmolejo reports a niece who was diagnosed with breast cancer in her 45s. She has another niece who was diagnosed with liver cancer in her 46s. Ms. Medranos mother passed away at age 50 from a heart issue and diabetic complications. Her mother had a large family consisting of 5 sisters and 2 brothers. Only one brother had a history of mesothelioma. Otherwise, there are no cancers in her extended maternal family. Ms. Pyle's father passed away at age 78 from lung cancer. His family consisted of two sisters and two brothers. There are no known cancers in her paternal family.      Ms. Javier Marmolejo reports [place], [place] ancestry and

## 2020-06-17 ENCOUNTER — TELEPHONE (OUTPATIENT)
Dept: ONCOLOGY | Age: 75
End: 2020-06-17

## 2020-06-17 NOTE — TELEPHONE ENCOUNTER
3 [facility]   Hereditary Cancer Risk Assessment      Name: Marisela Pyle  YOB: 1945  Date of Results Disclosure: 6/17/20     HISTORY   Ms. Gabriel Montanez was seen for genetic counseling on 6/4/20 at the request of Dr. Ronda Littlejohn due to her personal and family history of cancer. At that time, Ms. Pyle chose to pursue genetic testing via the CancerNext Expanded + RNA. These results were discussed with Ms. Pyle on 6/17/20. A summary of Ms. Pyle's results and recommendations are below. 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 In addition, no clinically relevant aberrant RNA transcripts were detected in select genes. Please refer to genetic test report for technical details. Mutations in the INDU gene are associated with an increased risk for the development of certain cancers as outlined below. General Population INDU Carriers    Female Breast  12% 26-52%   Prostate  16% Elevated   Pancreatic  <1%  Elevated     The risk for pancreatic and prostate cancers are increased; however, the exact risks are unknown at this time. In addition, there is limited data available to determine if a woman is at an increased risk to develop a second primary breast cancer after a first primary breast cancer.  There is limited data available to determine if male breast cancer risk is increased as well. Individuals who carry two INDU gene mutations are at risk for an autosomal recessive neurodegenerative disorder called Ataxia - Telangiectasia (A-T). Parents who each carry an INDU mutation have a 25% chance to have a child with A-T in each pregnancy. Therefore, these risks should be discussed with INDU carriers who are of reproductive age. RECOMMENDATIONS FOR INDU MUTATION CARRIERS   Individuals who carry a INDU mutation are encouraged to follow the [employer] (NCCN) Version 3.2019 guidelines for cancer screening and prevention as outlined below. FEMALE BREAST CANCER      NCCN Recommendation Age to Begin Frequency    Breast awareness - Women should be familiar with their breasts and promptly report changes to their healthcare provider. Periodic, consistent breast self-examination (BSE) may be beneficial  Individualized  N/A    Clinical Breast Examination  36years old*  Every 6-12 months   Consider breast MRI with contrast  36years old*  Annual   Mammogram (consider tomosynthesis)  35years old*  Annual    Consider risk reducing mastectomy based on personal and family history Individualized  N/A   Consider risk reducing agents (i.e. Tamoxifen)  Individualized  N/A    *age to begin screening based on the ages of breast cancer diagnoses in Ms. Pyle's family. Previous studies have suggested that radiation exposure may be associated with an increased risk for contralateral breast cancer in women who are carriers of very rare INDU missense variants. However, a meta-analysis including 5 studies showed that radiation therapy (with conventional dosing) is not contraindicated in patients with a single INDU mutation. Therefore, there is currently insufficient evidence to recommendation against radiation therapy in women who are carriers diagnosed with cancer.      There is currently no data regarding the benefit of risk reducing mastectomy for women with INDU mutation but this procedure may be considered based on personal and family history of breast cancer. PANCREATIC AND PROSTATE CANCER  No specific screening guidelines exist for pancreatic and prostate cancer. Screening may be individualized for those with a family history of pancreatic cancer and/or prostate cancer. OTHER CANCER     At this time, there is no clear evidence that suggests individuals with a INDU gene mutation have an increased risk for other cancers. Thus, individuals should complete an annual physician exam and follow general population screening guidelines for all other cancers at the direction of their physician. OTHER  Prenatal genetic counseling is recommended for individuals who are of reproductive age to discuss the risk of ataxia-telangiectasia for their offspring. If both parents have an INDU mutation, each of their children have a 25% chance to have this condition. Individuals should be advised of options for prenatal diagnosis and pre-implantation genetic diagnosis. RECOMMENDATIONS FOR FAMILY MEMBERS   First degree relatives (parents, siblings, and children) of individuals who carry an INDU mutation each have a 50% chance to inherit the familial mutation. 1) We recommend that Ms. Pyle's children consider genetic counseling and testing to help clarify their carrier status. If they are carriers, their children may be at risk to carry the familial mutation. 2) Due to the family history of cancer on both her maternal and paternal side, it is difficult to determine which side of the family the INDU mutation was inherited from. Thus, we recommend that both her paternal and maternal extended relatives consider genetic counseling and testing until this can be clarified.       3) At risk relatives who have not undergone genetic testing for the INDU mutation are encouraged to follow the NCCN recommendations above until their carrier status is clarified. Relatives may contact the [address] [place] [address] at [phone] or locate a genetic counselor at 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 2) Based on Ms. Pyle's test result, her lifetime risk to develop female breast cancer is increased. Therefore, we recommend that she consider the NCCN screening and risk reducing options outlined above. Given her previous history of bilateral breast cancer, she will continue to follow up with Dr. Teetee Meyers for further management recommendations. 3) Based on Ms. Pyle's test result, her lifetime risk for pancreatic cancer may be increased; however, the exact risk is unclear at this time. There are currently no consensus screening guidelines for pancreatic cancer. 4) We encourage Ms. Pyle to share her genetic test results with her family members. Support and resources were given to Ms. Pyle, including [employer]. She is encouraged to contact her genetic counselor for any additional questions or support. 5) We encourage Ms. Pyle to contact us with updates to her personal and/or familys cancer history as this information may alter our assessment and/or recommendations. The [address] [place] [address] would be glad to offer our assistance should you have any questions or concerns about this information. Please feel free to contact us at [phone]. Mervin Brunner.  Sharonda Acuña MS, [facility]   Licensed Genetic Counselor       CC:   Ms. Niko Ring MD

## 2021-06-03 ENCOUNTER — HOSPITAL ENCOUNTER (OUTPATIENT)
Dept: MAMMOGRAPHY | Age: 76
Discharge: HOME | End: 2021-06-05
Payer: MEDICARE

## 2021-06-03 DIAGNOSIS — Z12.31 SCREENING MAMMOGRAM FOR HIGH-RISK PATIENT: ICD-10-CM

## 2021-06-03 PROCEDURE — 77063 BREAST TOMOSYNTHESIS BI: CPT

## 2022-02-01 ENCOUNTER — HOSPITAL ENCOUNTER (OUTPATIENT)
Dept: MAMMOGRAPHY | Age: 77
Discharge: HOME | End: 2022-02-03
Payer: MEDICARE

## 2022-02-01 DIAGNOSIS — Z78.0 POSTMENOPAUSAL: ICD-10-CM

## 2022-02-01 PROCEDURE — 77080 DXA BONE DENSITY AXIAL: CPT

## 2022-03-02 ENCOUNTER — HOSPITAL ENCOUNTER (OUTPATIENT)
Dept: OCCUPATIONAL THERAPY | Age: 77
Setting detail: THERAPIES SERIES
Discharge: HOME | End: 2022-03-02
Payer: MEDICARE

## 2022-03-02 PROCEDURE — 97166 OT EVAL MOD COMPLEX 45 MIN: CPT

## 2022-03-02 PROCEDURE — 97535 SELF CARE MNGMENT TRAINING: CPT

## 2022-03-02 NOTE — CONSULTS
TREATMENT LOCATION:   [x] [address]: [phone]  F: [phone] [] [address]: [phone]  F: [phone]      Lymphedema Services - Initial Evaluation for Upper Extremity    Date:  3/2/2022  Patient: Samuel Bojorquez  : [birth year]             MRN: [id number]  Referring Physician: Ngoc Oneill MD       Phone: [phone]  Fax: [phone]  Insurance: Medicare (AV:[id number])/[employer] supplement (FL:[id number]) - visits BMN  Medical Diagnosis: I95 - L breast  Rehab Codes: I89.0, I97.2   Onset Date: 22   Visit# / total visits: ; Progress note due by visit 10 per POC  ( Certification Dates: 3/2/2022 - 22)    Allergies:  [x] None       [] Latex       [] Adhesive tape       [] Medications    [] Other  Medications: See charted information in Epic  Past Medical History: See charted information in Epic     Restrictions: No blood pressure/blood draw in: BUE  Fall Risk:   [x] No    [] Yes   If yes, intervention:       Overview: Patient is a 67year old female referred to the Lymphedema Clinic with a diagnosis of bilateral Upper Extremity Lymphedema. Symptoms in BUE and R breast.      PHYSICIAN NOTES from Dr Adriel Fishman: \"The patient has developed some lymphedema of the L arm and has persistent lymphedema of the R breast.\"    SUBJECTIVE:      Pt reports that she noticed a difference in her LUE about a year ago when she looked at her LUE compared to RUE and there were less wrinkles. Pt had not noticed her R breast until Dr. Adriel Fishman pointed it out at her last follow up visit. She feels like her sleep is often disrupted by symptoms.     Hx of Complete Decongestive Therapy:[]Yes - Date:        [x]No   Rate of onset:   [x] Slow   [] Rapid     [] Acute   Progression: [] Improving   []  Worsening  [x] Consistent   Conservative Treatments Utilized in the Past:  [x] None  [] Compression Garments   [] Bandaging  [] Elevation   [] Exercise  []Self MLD  [] Other/comments:      Current Lymphedmea Treatments:   [x] None  [] Compression Garments   [] Bandaging  [] Elevation  [] Exercise   []Self MLD  [] Other/comments:          Aggravating factors:  [x] None   [] Activity  [] Stress  [] Sleep Position [] Travel [] Hot Temperatures [] Diet   []  Other/comments:    Relieving factors:   [x] None [] Elevation  [] Activity  [] Compression  [] Rest  [] Cold Temperatures [] Diet   []  Other/comments:    Comments: Overall pt's symptoms are mild. 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 [x]Yes  []No  [] N/A   SOZO bioimpedance measure?  []Yes  [x]No      Comorbidities:   [] Obesity [] Dialysis  [x] Other: R RTC tear   [] Asthma/COPD [] Dementia [x] Other: '98 and '99 TKA's (bilateral)   [] Stroke [] Sleep apnea [x] Other: hysterectomy   [] Vascular disease [] Rheumatic disease [] Other:     Absolute Lymphedema Contraindications - treatement [] NONE     [] CHF (only if patient is un-medicated or edema is solely d/t cardiac failure)    [] DVT- Acute, No MLD to limb       [] Advanced Renal Disease - Need Physician Clearance   [] Acute Skin Infection ( e.g. Cellulitis, Ersipelas)   [x] Thyroid condition (caution with MLD to neck)  - hyperthyroidism controlled without medication   [x] Cardiac Arrhythmia   [] Hypersensitive Carotid Sinus    Absolute Contraindications Regarding the Deep Abdomen: [x] NONE    [] Pregnancy    [] Abdominal Aortic Aneurism - Current or history of    [] Inflammatory Disease of bowel or intestines   [] Severe Arteriosclerosis    [] Intra-abdominal scar formations following surgery   [] Recent abdominal surgery   [] Pelvic DVT- Current or history of   [] Presence of clot prevention devices ( e.g. - [place] Filter)     Relative Lymphedema Contraindications [] NONE      [] Malignant Lymphedema - Edema is being caused by active cancer. MLD and CDT considered palliative during active cancer treatments. Physician approval required. [x] Age 61+    [] Limb paralysis     Home/Work Environment  Patient lives with: Spouse, who is also in good health   In what type of home []  One story   [x] Two story - upstairs bed/bath   [] Split level  [] Apartment   Number of stairs to enter 4-5   With handrail on the [x]  Right to enter   [] Left to enter   Bathroom has a []  Tub only  [x] Tub/shower combo   [] Walk in shower    [x]  Grab bars   Washing machine is on []  Main level   [] Second level   [x] Basement   Employer    Job Status []  Normal duty   [] Light duty   [] Off due to condition    [x]  Retired   [] Not employed   [] Disability  [] Other:  []  Return to work:    Work activities/duties        ADL/IADL Previous level of function Current level of function Who currently assists the patient with task   Bathing  [x] Independent  [] Assist [x] Independent  [] Assist    Dress/grooming [x] Independent  [] Assist [x] Independent  [] Assist    Transfer/mobility [x] Independent  [] Assist [x] Independent  [] Assist    Feeding [x] Independent  [] Assist [x] Independent  [] Assist    Toileting [x] Independent  [] Assist [x] Independent  [] Assist    Driving [x] Independent  [] Assist [x] Independent  [] Assist    Housekeeping [x] Independent  [] Assist [x] Independent  [] Assist    Grocery shop/meal prep [x] Independent  [] Assist [x] Independent  [] Assist Some help with reaching into cabinets     Gait Prior level of function Current level of function    [x] Independent  [] Assist [x] Independent  [] Assist   Device: [] Independent [x] Independent    [] Straight Cane [] Quad cane [] Straight Cane [] Quad cane    [] Standard walker [] Rolling walker   [] 4 wheeled walker [] Standard walker [] Rolling walker   [] 4 wheeled walker    [] Wheelchair [] Wheelchair         OBJECTIVE    Physical Status:   Range of motion: Deficits [x] Yes [] No       Comment: difficulty reaching due to 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       Circumferential Measurements  Measurements taken from nail base of D3 digit. Fingers are measured at the base. Measurements (cm) Right Left   Dorsum 10 19.2 18.9   Wrist 16 16.6 17.0   Lower Forearm 22 17.8 18.8   Upper Forearm  34 24.0 24.6   Olecranon 39 24.5 26.3   Lower Bicep 46 29.4 30.7   Upper Bicep 54 34.8 34.3   Initial Total 3/2/2022:   166.3 170.6            ASSESSMENT: Patient would benefit from skilled occupational therapy services in order to: Decrease edema that has accumulated in the extremity, decrease risk of infection, increase ease & independence with ADL, improve limb ROM, and improve overall quality of life. Pt is provided with a folder which included educational hand out on the basics of lymphedema, program details and what to expect for further review at home.  Writer educates on an impaired lymphatic system vs. a healthy lymphatic system and how it relates to swelling and skin integrity. Writer reviews findings from evaluation as they compare to her risk factors for lymphedema. Pt is also educated, in more detail, on the purpose of lymphedema treatments and a POC that would be of benefit to them. This may include:     []Bandaging   []Self-Bandaging/Caregiver Training   [x]MLD    [x]Self-MLD   [x] Therapeutic Exercise    [x] Education/Instruction in home management program  [] Education and trial of a Vasopneumatic Pump    [x] Education and transition to long term management devices such as velcro compression garments and/or daytime compression sleeve/stocking(s)   [x]Discharge to Home Program     Pt is also educated extensively on benefits of compression sleeve & compression in general for reduction of swelling, management of symptoms, and to prevent disease compression. Pt has compression camis and will bring them to next visit. It is recommended that pt obtain preventative compression sleeve. Pt is educated on ordering process for obtaining compression arm sleeve & is measured for the following garment. Pt is agreeable for the order to be submitted on their behalf via [employer] program.    Name: Hermann Calixto  MRN: [id number]  Brand & Style: Lizette Addison with Silicone top band x2 for bilateral upper extremities  Extremity:  Bilateral upper extremities  Compression: 20-30mmHg  Size: IV  Width Regular/Standard  Length: Regular  Color/Pattern: black  Silicone Border: YES   Treatment location: [facility]        Response to treatment: Pt verbalizes and is agreeable to the instructions/ POC established at today's evaluation.         PLAN FOR NEXT VISIT: self-MLD, compression rafael, swell spot/chip pad      Lymphedema Stage per ISL Guidelines    [] 0: Subclinical with no evidence on physical exam  [x] 1:  Early onset, swelling/heaviness, pitting edema, subsides with limb elevation  [] 2:  More advanced, fibrosis resulting in non-pitting edema, does not respond to elevation, thickening of the tissues  [] 3: Elephantiasis, pitting absent, huge limbs with dry/scaly, papillomatosis, hyperkeratosis, fluid may be leaking with recurrent cellulitis. Acanthosis (deep body folds). Elevation &   diuretics ineffective. Therapy Goals  STG - To be addressed within 4 visits    Pt will demonstrate compliance of maintaining lymphedema precautions to reduce the risks of infection and further exacerbations. Pt will demonstrate independence with decongestive exercise program in order to expedite fluid rerouting. LTG To be adressed within 8 visits     Pt will demonstrate competence with SELF-MLD ( manual lymphatic drainage) in order to reroute lymphatic pathways for decreased swelling. Pt/Caregiver will demonstrate independence with donning/doffing and wearing schedule for compression garments/ devices to maintain decreased size upon discharge. Pt to compliant with CDT in order to reduce edema in the BUE by 3+ cm per limb total and in the breast AEB by reduction in palpable firmness to underside of breast.         Patient's Goal: Reduce symptoms, increase comfort    Response to Education Provided:  [x] Verbalized understanding   [] Demonstrates/verbalizes HEP/Education previously given      [x] Needs continued review            [] No understanding   Learner(s): [x] Patient  [] Spouse [] Family  [] Other:   Method(s): [x] Verbal [] Demonstration [] Handouts [] Exercise booklet   Family available to assist with home program if needed: [] Yes [x] No    FREQUENCY: Pt will be seen 1 x/ week for 8 additional visits and will follow up as appropriate. Focus is to remain on short and long term goals listed above.      Rehabilitation Potential/Commitment: [x] Good [] Fair     [] Poor    Functional Outcome Measure(s):  Lymphedema Life Impact Scale (LLIS) Score: 26%   Brief Fatigue Inventory: 3- mild    Clearance needed:  []Yes    [x]No     Physicians/specialties giving clearance:    Referral needed to: [] Physical Therapy   [] Speech Therapy                 Treatment Charges   Minutes   Units   Evaluation                                                             $95.15 / $75.40            Low   (54853)            Moderate   (95683) 20 1          High   (72676)     Manual Therapy (33260):                                      $26.92 / $21.34     Therapeutic activities (93082):                             $37.46/ $26.79     Therapeutic Exercise (80037)                              $29.21/$ 22.84     Self care/home mgmt (09130)                              $32.39 / $24.43 55 4   Vasopneumatic Device (61865)                           $9.21     Total Treatment Time    75 5     Medicare Tracking - $2150 cap Totals   Today 192.87   Previous     Grand Total 192.87       Time In:  3:10  Time Out: 4:25      Electronically signed by Michelle Finney OT on 3/2/2022 at 3:10 PM      Physician Signature: _________________________        Date: _______________  By signing above or cosigning this note, I have reviewed this plan of care and certify a need to continue medically necessary rehabilitation services.       *PLEASE SIGN ABOVE AND FAX BACK .[phone] WITH ALL PAGES FOR CONSENT TO CONTINUE LYMPHEDEMA TREATMENT*

## 2022-03-09 ENCOUNTER — HOSPITAL ENCOUNTER (OUTPATIENT)
Dept: OCCUPATIONAL THERAPY | Age: 77
Setting detail: THERAPIES SERIES
Discharge: HOME | End: 2022-03-09
Payer: MEDICARE

## 2022-03-09 PROCEDURE — 97535 SELF CARE MNGMENT TRAINING: CPT

## 2022-03-09 NOTE — FLOWSHEET NOTE
TREATMENT LOCATION:   [x] [address]   [facility]: [phone]  F: [phone] [] [address]: [phone]  F: [phone]        Lymphedema Services - Treatment Note of the Upper Extremity    Date:  3/9/2022  Patient: Mark Pyle  :              MRN: [id number]  Referring Physician: Lloyd Leigh MD       Phone: [phone]  Fax: [phone]  Insurance: Medicare ([id number])/[employer] supplement (DEDRICK:[id number]) - visits BMN  Medical Diagnosis: Q34 - L breast  Rehab Codes: I89.0, I97.2      Onset Date: 22   Visit# / total visits: 2/5; Progress note due by visit 10 per POC  ( Certification Dates: 3/2/2022 - 22)     Allergies:  [x]? None       []? Latex       []? Adhesive tape       []? Medications    []? Other  Medications: See charted information in Epic  Past Medical History: See charted information in Epic          Restrictions: No blood pressure/blood draw in: BUE      Contraindications/Precautions:   [x]? Thyroid condition (caution with MLD to neck)  - hyperthyroidism controlled without medication  [x]? Cardiac Arrhythmia (caution with MLD to neck)  [x]? Age 61+ (caution with MLD to neck)      Subjective: Pt with no new reports since last visit. Pain: [] YES    [x] NO     Location: n/a      Pain Rating: ( 0-10 scale) : 0/10  Comments:     Plan for next session:  Review/progress self-MLD, sleeve fit and train as able, provide handouts that pt left behind today          Objective:   [x] Measurement [] Bandaging [] MLD [] Skin care   [x] Education [] Self-bandaging [x] Self-MLD [] Wound Care   [] Exercise [] Caregiver training [] Kinesiotaping [] Other:    [] Nutrition [] Garment fitting/training [] Vasopneumatic Pump           Circumferential Measurements   Measurements taken from nail base of D3 digit. Fingers are measured at the base.    Measurements (cm) Right Left   Dorsum 10 19.2 18.9   Wrist 16 16.6 17.0   Lower Forearm 22 17.8 18.8   Upper Forearm  34 24.0 24.6   Olecranon 39 24.5 26.3   Lower Bicep 46 29.4 30.7   Upper Bicep 54 34.8 34.3   Initial Total 3/2/2022:   166.3 170. 6      Measurements of Chest Wall  Measurements (cm) 3/9/22   axilla 94.0   Nipple line 101.6               Assessment:  [x] Progressing toward goals. Pt brings compression/shape wear camis today for assessment by therapist. They either do not have enough spandex for containment, or spandex is only over abdomen. Pt is educated on appropriate options and where to obtain, either online or via local vendor. Pt is educated on benefits of swell spot for softening of protein density and she is provided chip pad fabricated by therapist to be worn against R breast. Pt is instructed to only leave on for a few hours at a time to maintain skin integrity and monitor for any irritation. Chip pad/swell spot provide best benefit under compression. Self-MLD education initiated utilizing handouts below along with visual, verbal, and tactile cueing for proper sequence and hands on technique. Pt demo good  teachback following instruction. Modifications provided for shoulder ROM deficits. Self-MLD Education     What is MLD?: MLD (or Manual Lymph Drainage) engages healthy lymph nodes & vessels in the body to create a suctioning effect to draw fluid away from full/affected areas. Additionally, we are teaching your lymphatic system to reroute fluid to healthy lymph vessels, which then drain into the venous system. It is recommended that you complete your self-MLD 2x per day. You are welcome to complete more if you'd like. Skin on skin contact is best to achieve best stretch. It may be easiest for you to complete self-MLD while you are in bed or reclined. Therefore, you may want to complete in the AM after you wake up and in the PM before you go to bed. Tips:   Self-MLD should be completed SLOWLY.  Please do not rush or MLD is not effective. Remember to stretch and let go to allow the skin to recoil. Pressure applied = the amount of pressure you should apply when stroking a s head. Circles should be large enough to stretch the skin, but NOT slide over the skin. No rubbing or petting the skin.            Self-MLD Pre-Treatment for Upper Extremity Lymphedema      1. Deep breathing x5 - belly expands as you breathe in and deflates as you breathe out. Breathe in through the nose, out through the mouth. 2. Half [place] stretches at the left groin x10    3. Stretches from left armpit down to left groin x5, with 3 extra stretches at intermediate point, on your way down. 4. Half [place] stretches at the right groin x10    5. Stretches from right armpit down to left groin x5, with 3 extra stretches at intermediate point, on your way down. 6. Addressing areas of swelling over breast/in armpit: Think of the routes above as a major highway(s) to the lymph nodes at your groin. . Now, find where you are swollen over the chest and move fluid (using skin stretches) to those routes and down to the groin on either side. [] No change. [] Other:  [x] Patient would continue to benefit from skilled occupational therapy services in order to decrease swelling in BUE/R breast.                  Therapy Goals:   STG - To be addressed within 4 visits     Pt will demonstrate compliance of maintaining lymphedema precautions to reduce the risks of infection and further exacerbations. progressing 3/9/22     Pt will demonstrate independence with decongestive exercise program in order to expedite fluid rerouting. Hold until compression garments obtained 3/9/22        LTG To be adressed within 8 visits      Pt will demonstrate competence with SELF-MLD ( manual lymphatic drainage) in order to reroute lymphatic pathways for decreased swelling.  progressing 3/9/22     Pt/Caregiver will demonstrate independence with donning/doffing and wearing schedule for compression garments/ devices to maintain decreased size upon discharge. progressing 3/9/22     Pt to compliant with CDT in order to reduce edema in the BUE by 3+ cm per limb total and in the breast AEB by reduction in palpable firmness to underside of breast. progressing 3/9/22              Pt. Education:  [x] Yes  [] No  [x] Reviewed Prior HEP/Ed  Method of Education: [x] Verbal  [x] Demo  [x] Written  Comprehension of Education:  [x] Verbalizes understanding. [x] Demonstrates understanding. [x] Needs review. [] No understanding  Knowledge of home program:  [x] Good [] Fair [] Poor [] With assist from family/caregiver        Plan:   [x] Continue current frequency toward long and short term goals.           Treatment Charges Minutes   Units   Re-evaluation (68754):$   64.01/$49.52                                           Manual Therapy (60745):             $26.92 / $21.34     Therapeutic activities (97922):   $37.46/ $26.79     Therapeutic Exercise (90580):   $29.21/$ 22.84     Self care/home mgmt (60773):    $32.39 / $24.43 60 4   Vasopneumatic Device (25783):   $9.21     Total Treatment Time    60 4     Medicare Tracking - $2150 cap Totals   Today 105.68   Previous   192.87   Grand Total 211.36          Time In:  1100  Time Out: 1200      Electronically signed by Dorothy Alejandro OT on 3/9/2022 at 11:02 AM

## 2022-03-16 ENCOUNTER — HOSPITAL ENCOUNTER (OUTPATIENT)
Dept: OCCUPATIONAL THERAPY | Age: 77
Setting detail: THERAPIES SERIES
Discharge: HOME | End: 2022-03-16
Payer: MEDICARE

## 2022-03-16 PROCEDURE — 97535 SELF CARE MNGMENT TRAINING: CPT

## 2022-03-16 PROCEDURE — 97140 MANUAL THERAPY 1/> REGIONS: CPT

## 2022-03-16 NOTE — FLOWSHEET NOTE
TREATMENT LOCATION:   [x] [address]   [facility] [place]: [phone]  F: [phone] [] [address]: [phone]  F: [phone]        Lymphedema Services - Treatment Note of the Upper Extremity    Date:  3/16/2022  Patient: Violetta Maddox  :              MRN: [id number]  Referring Physician: Lulu Zepeda MD       Phone: [phone]  Fax: [phone]  Insurance: Medicare ([id number])/[employer] supplement ([id number]) - visits BMN  Medical Diagnosis: K36 - L breast  Rehab Codes: I89.0, I97.2      Onset Date: 22   Visit# / total visits: 3; Progress note due by visit 10 per POC  ( Certification Dates: 3/2/2022 - 22)     Allergies:  [x]? None       []? Latex       []? Adhesive tape       []? Medications    []? Other  Medications: See charted information in Epic  Past Medical History: See charted information in Epic          Restrictions: No blood pressure/blood draw in: BUE      Contraindications/Precautions:   [x]? Thyroid condition (caution with MLD to neck)  - hyperthyroidism controlled without medication  [x]? Cardiac Arrhythmia (caution with MLD to neck)  [x]? Age 61+ (caution with MLD to neck)      Subjective: Pt reports that she is going to hold off on bra for right now.     Pain: [] YES    [x] NO     Location: n/a      Pain Rating: ( 0-10 scale) : 0/10  Comments:     Plan for next session:  Review/progress self-MLD, sleeve fit and train as able (with wearing schedule edu), f/u ktape test patch, monitor slight redness over R breast, chest measurements, risk reduction handout          Objective:   [x] Measurement [] Bandaging [x] MLD [] Skin care   [x] Education [] Self-bandaging [x] Self-MLD [] Wound Care   [] Exercise [] Caregiver training [] Kinesiotaping [] Other:    [] Nutrition [] Garment fitting/training [] Vasopneumatic Pump           Circumferential Measurements   Measurements taken from nail base of D3 digit. Fingers are measured at the base. Measurements (cm) Right Left   Dorsum 10 19.7 18.7   Wrist 16 16.6 16.8   Lower Forearm 22 17.7 18.8   Upper Forearm  34 23.6 24.5   Olecranon 39 23.6 26.4   Lower Bicep 46 27.2 29.4   Upper Bicep 54 34.0 32.2   Current: 3/16/22    Initial Total 3/2/2022:   162.4    166.3 166.8    170. 6      Measurements of Chest Wall  Measurements (cm) 3/9/22    axilla 94.0    Nipple line 101.6                Assessment:  [x] Progressing toward goals. Pt arrives without compression as we are still waiting on sleeve order to arrive. LUE remains visibly more full than RUE. R breast with mild redness & pt is instructed to monitor for symptoms of infection (increased redness, hot to touch, increased swelling, pain). Will follow up next visit. Pt is provided with handouts from last session as they were left behind. With handouts, demo, verbal, & tactile cueing writer reviews self-MLD as instructed thus far. Pt reports she has been attempting self-MLD from memory since she did not have handouts. She demo's fair return. Pt is also provided with kinesiotape test patch applied to chest. Pt is educated on use of and benefits of kinesiotape to aid in reduction of edema. Pt is instructed to remove test patch if irritation occurs. Writer follows up on compression bra for breast compression & pt reports she will hold off at this time. Pt has been wearing chip pad over R breast provided at last visit to aid in softening of firm tissue & fluid, but it would be more effective underneath compression bra. Manual lymph drainage completed to aid in decongestion of LUE and to continue softening areas. Sequences included: deep abdominals, activation of bilateral inguinal lymph nodes, establishment of L AI anasatmoses, and full sequence to LUE. Extra time spent over L forearm to aid in softening & decongestion in this areas that is more affected.                                   [] No change. [] Other:  [x] Patient would continue to benefit from skilled occupational therapy services in order to decrease swelling in BUE/R breast.                  Therapy Goals:   STG - To be addressed within 4 visits     Pt will demonstrate compliance of maintaining lymphedema precautions to reduce the risks of infection and further exacerbations. progressing 3/9/22     Pt will demonstrate independence with decongestive exercise program in order to expedite fluid rerouting. Hold until compression garments obtained 3/9/22        LTG To be adressed within 8 visits      Pt will demonstrate competence with SELF-MLD ( manual lymphatic drainage) in order to reroute lymphatic pathways for decreased swelling. progressing 3/16/22     Pt/Caregiver will demonstrate independence with donning/doffing and wearing schedule for compression garments/ devices to maintain decreased size upon discharge. progressing 3/9/22     Pt to compliant with CDT in order to reduce edema in the BUE by 3+ cm per limb total and in the breast AEB by reduction in palpable firmness to underside of breast. progressing 3/16/22              Pt. Education:  [x] Yes  [] No  [x] Reviewed Prior HEP/Ed  Method of Education: [x] Verbal  [x] Demo  [x] Written  Comprehension of Education:  [x] Verbalizes understanding. [x] Demonstrates understanding. [x] Needs review. [] No understanding  Knowledge of home program:  [x] Good [] Fair [] Poor [] With assist from family/caregiver        Plan:   [x] Continue current frequency toward long and short term goals.           Treatment Charges Minutes   Units   Re-evaluation (92999):$   64.01/$49.52                                           Manual Therapy (59675):             $26.92 / $21.34 25 2   Therapeutic activities (98968):   $37.46/ $26.79     Therapeutic Exercise (07395):   $29.21/$ 22.84     Self care/home mgmt (54027):    $32.39 / $24.43 35 2   Vasopneumatic Device (33150):   $9.21 Total Treatment Time    60 4     Medicare Tracking - $2150 cap Totals   Today 99. 5?0   Previous  211.36   Grand Total 310.86?          Time In:  1630 Time Out: 5359      Electronically signed by Phill Garcia OT on 3/16/2022 at 4:35 PM

## 2022-03-23 ENCOUNTER — HOSPITAL ENCOUNTER (OUTPATIENT)
Dept: OCCUPATIONAL THERAPY | Age: 77
Setting detail: THERAPIES SERIES
Discharge: HOME | End: 2022-03-23
Payer: MEDICARE

## 2022-03-23 PROCEDURE — 97535 SELF CARE MNGMENT TRAINING: CPT

## 2022-03-23 NOTE — FLOWSHEET NOTE
TREATMENT LOCATION:   [x] [address] [place], [place] [address]: [phone]  F: [phone] [] [address]: [phone]  F: [phone]        Lymphedema Services - Treatment Note of the Upper Extremity    Date:  3/23/2022  Patient: Gama Richter  :              MRN: [id number]  Referring Physician: Jade Dupree MD       Phone: [phone]  Fax: [phone]  Insurance: Medicare ([id number])/[employer] supplement ([id number]) - visits BMN  Medical Diagnosis: O12 - L breast  Rehab Codes: I89.0, I97.2      Onset Date: 22   Visit# / total visits: 4/5; Progress note due by visit 10 per POC  ( Certification Dates: 3/724330 - 22)     Allergies:  [x]? None       []? Latex       []? Adhesive tape       []? Medications    []? Other  Medications: See charted information in Epic  Past Medical History: See charted information in Epic          Restrictions: No blood pressure/blood draw in: BUE      Contraindications/Precautions:   [x]? Thyroid condition (caution with MLD to neck)  - hyperthyroidism controlled without medication  [x]? Cardiac Arrhythmia (caution with MLD to neck)  [x]? Age 61+ (caution with MLD to neck)      Subjective: Pt reports she has been doing self-MLD as indicated. Pain: [] YES    [x] NO     Location: n/a      Pain Rating: ( 0-10 scale) : 0/10  Comments:     Plan for next session:  Review self-MLD, sleeve fit and train as able (with wearing schedule edu), f/u ktape, nipple line chest measurements, decongestive exercises if able with arrival of sleeves          Objective:   [x] Measurement [] Bandaging [] MLD [] Skin care   [x] Education [] Self-bandaging [x] Self-MLD [] Wound Care   [] Exercise [] Caregiver training [] Kinesiotaping [] Other:    [] Nutrition [] Garment fitting/training [] Vasopneumatic Pump           Circumferential Measurements   Measurements taken from nail base of D3 digit. Fingers are measured at the base. Measurements (cm) Right Left   Dorsum 10 19.6 18.5   Wrist 16 16.8 16.7   Lower Forearm 22 17.3 18.7   Upper Forearm  34 23.8 24.1   Olecranon 39 23.8 26.2   Lower Bicep 46 27.8 29.2   Upper Bicep 54 30.0 32.0   Current: 3/23/22    3/16/22    Initial Total 3/2/2022:   159.1    162.4    166.3 165.4    166.8    170. 6      Measurements of Chest Wall  Measurements (cm) 3/9/22 3/23/22   axilla 94.0 92.5   Nipple line 101.6                Assessment:  [x] Progressing toward goals. Pt compression sleeves have yet to arrive. Updated measurements along with observations indicate LUE still with swelling. Pt reports no adverse response to kinesiotape test patch. Writer reviews benefits and educates on application technique as it is applied to pt's R breast directing fluid superiorly toward supraclavicular lymph nodes. Pt is instructed to wear 3-4 days before removing. Self-MLD can be completed over top. Pt is able to shower. Self-MLD thus far is reviewed and writer progresses education to include sequence for arm. Writer provides printed instructions along with demonstration of each step. Pt demo good teachback and understanding. See below for handout provided. Pt is also educated on risk reduction techniques to prevent further exacerbations of swelling and infection. See handout below. Lymphedema Risk Reduction Techniques    People who have had surgery or trauma to the lymphatic system should be aware of activities that put too much pressure on the affected arm or leg. Protective measures to avoid injury, swelling, and infection include:    Maintaining Proper Hygiene   Clean the skin of the affected Arm daily and apply lotion.  Take proper care of the fingernails and avoid cutting cuticles too short.  Clean all cuts with soap and water, and then apply antibacterial ointment and a sterile dressing.  Use unscented lotion daily to keep the skin moisturized.     Staying Start just above the elbow and direct skin stretchesto the outside of your arm. Keep using this method as you work your way up the arm until you have reached just below armpit - x5 per section    4. Skin stretches on both sides of elbow - x5 on each side    5. Skin stretches on front of forearm and back of forearm working your way from wrist to elbow - x5 per side     6. Skin stretches on the back of the hand (palm optional) - x5    7. Skin stretches over top of all fingers at the same time or special technique for each finger individually - x5    Once you have made it to the end of the arm, work your back up the arm from steps 7 to 1. You only need to complete 1-2x instead of 5x. After you get back to the shoulder, re-work the pathway from armpit to groin. Repeat on other arm. Feel free to give \"problem areas\" extra attention.                                    [] No change. [] Other:  [x] Patient would continue to benefit from skilled occupational therapy services in order to decrease swelling in BUE/R breast.                  Therapy Goals:   STG - To be addressed within 4 visits     Pt will demonstrate compliance of maintaining lymphedema precautions to reduce the risks of infection and further exacerbations. progressing 3/23/22     Pt will demonstrate independence with decongestive exercise program in order to expedite fluid rerouting. Hold until compression garments obtained 3/9/22        LTG To be adressed within 8 visits      Pt will demonstrate competence with SELF-MLD ( manual lymphatic drainage) in order to reroute lymphatic pathways for decreased swelling. progressing 3/23/22     Pt/Caregiver will demonstrate independence with donning/doffing and wearing schedule for compression garments/ devices to maintain decreased size upon discharge.  progressing 3/9/22     Pt to compliant with CDT in order to reduce edema in the BUE by 3+ cm per limb total and in the breast AEB by reduction in palpable firmness to underside of breast. progressing 3/23/22              Pt. Education:  [x] Yes  [] No  [x] Reviewed Prior HEP/Ed  Method of Education: [x] Verbal  [x] Demo  [x] Written  Comprehension of Education:  [x] Verbalizes understanding. [x] Demonstrates understanding. [x] Needs review. [] No understanding  Knowledge of home program:  [x] Good [] Fair [] Poor [] With assist from family/caregiver        Plan:   [x] Continue current frequency toward long and short term goals. Treatment Charges Minutes   Units   Re-evaluation (86666):$   64.01/$49.52                                           Manual Therapy (83044):             $26.92 / $21.34     Therapeutic activities (29495):   $37.46/ $26.79     Therapeutic Exercise (46781):   $29.21/$ 22.84     Self care/home mgmt (59948):    $32.39 / $24.43 40 3   Vasopneumatic Device (61227):   $9.21     Total Treatment Time    40 3     Medicare Tracking - $2150 cap Totals   Today 81.25   Previous  310.86?    Grand Total 392.11          Time In:  1430 Time Out: 1520      Electronically signed by Elian Boothe OT on 3/23/2022 at 1:05 PM

## 2022-03-30 ENCOUNTER — HOSPITAL ENCOUNTER (OUTPATIENT)
Dept: OCCUPATIONAL THERAPY | Age: 77
Setting detail: THERAPIES SERIES
Discharge: HOME | End: 2022-03-30
Payer: MEDICARE

## 2022-03-30 PROCEDURE — 97535 SELF CARE MNGMENT TRAINING: CPT

## 2022-03-30 NOTE — FLOWSHEET NOTE
TREATMENT LOCATION:   [x] [address]   [facility]: [phone]  F: [phone] [] [address]: [phone]  F: [phone]        Lymphedema Services - Treatment Note of the Upper Extremity    Date:  3/30/2022  Patient: Colie Favre  :              MRN: [id number]  Referring Physician: Maryanne Tristan MD       Phone: [phone]  Fax: [phone]  Insurance: Medicare ([id number])/[employer] supplement ([id number]) - visits BMN  Medical Diagnosis: J97 - L breast  Rehab Codes: I89.0, I97.2      Onset Date: 22   Visit# / total visits: ; Progress note due by visit 10 per POC  ( Certification Dates: 3/181425 - 22)     Allergies:  [x]? None       []? Latex       []? Adhesive tape       []? Medications    []? Other  Medications: See charted information in Epic  Past Medical History: See charted information in Epic          Restrictions: No blood pressure/blood draw in: BUE      Contraindications/Precautions:   [x]? Thyroid condition (caution with MLD to neck)  - hyperthyroidism controlled without medication  [x]? Cardiac Arrhythmia (caution with MLD to neck)  [x]? Age 61+ (caution with MLD to neck)      Subjective: Pt with no new reports since last visit. Pain: [] YES    [x] NO     Location: n/a      Pain Rating: ( 0-10 scale) : 0/10  Comments:     Plan for next session:  Follow up, dc as able, update measurements          Objective:   [x] Measurement [] Bandaging [] MLD [] Skin care   [x] Education [] Self-bandaging [] Self-MLD [] Wound Care   [] Exercise [] Caregiver training [] Kinesiotaping [] Other:    [] Nutrition [x] Garment fitting/training [] Vasopneumatic Pump           Circumferential Measurements   Measurements taken from nail base of D3 digit. Fingers are measured at the base.    Measurements (cm) Right Left   Dorsum 10 19.1 18.5   Wrist 16 16.5 16.7   Lower Forearm 22 17.2 18.3 Upper Forearm  34 23.8 24.0   Olecranon 39 24.1 25.5   Lower Bicep 46 27.9 28.8   Upper Bicep 54 30.6 31.5   Current: 3/30/21     3/23/22    3/16/22    Initial Total 3/2/2022:   159.2    159.1    162.4    166.3 163.3    165.4    166.8    170. 6      Measurements of Chest Wall  Measurements (cm) 3/9/22 3/23/22   axilla 94.0 92.5   Nipple line 101.6                Assessment:  [x] Progressing toward goals. Review self-MLD, sleeve fit and train as able (with wearing schedule edu), f/u ktape, nipple line chest measurements, decongestive exercises if able with arrival of sleeves    Pt arrives today with spouse who is here for caregiver training. Pt's sleeves have arrived. Pt and spouse are educated on donning/doffing technique via demo & verbal instruction and demo understanding. Sleeves are of good fit. Writer reviews wearing schedule with RUE to be worn during \"high risk activities\" and LUE to be worn daily until follow up visit in about 30 days. Pt is also educated on decongestive exercises to help drain fluid from the tissue. Patient demonstrates good understanding of exercises with verbal and visual demo. See below for more details/ hand out that was provided to patient for home follow through. Exercise Program Guidelines for Patients with Lymphedema  - Perform your exercises while wearing compression garments. - Try to exercise 2x daily for 5-10 reps and increase the length of the exercise session SLOWLY over a comfortable period of time. - Perform each exercise in a slow controlled manner.   - Begin and end each exercise session with breathing exercises. (Diaphragmatic breathing)   - It is recommended that you rest at least 15-20 minutes with your limb elevated after you complete your exercise. ** Do NOT perform any movements that induce pain. ** Is it important for these exercises to be performed in the order they are listed in order to best aid in decongestion of the limb(s).     Neck Exercises:  1. Head Turn and Stretches: Turn head and look right; return to normal position; repeat to left side   2. Chin Up and Down: Bend chin down toward chest, then look up toward ceiling  3. Ear to shoulder: Try to touch ear to shoulder, return to normal position, repeat to left side    Trunk Exercise:   1. Torso Twist: Turn shoulders as far as you can to the right, return to start, complete to the left  2. Bend forward at waist, return to starting position, bend slightly backwards. 3. Bend sideways to the Right, bend sideways to the left. Arm Exercises:   1. Shoulder Shrugs: Shrug both shoulders and inhale, Lower both shoulders and exhale. 2. Shoulder [place]: Rotate shoulders forward (like rowing a boat), then rotate shoulders backwards  3. Climb to eliecer: Hold arms above head, grasp imaginary ladder, alternate using both arms  4. Audrene Benites: Lift both arms out to the side and over your head then lower. 5. Elbow curls  6. Wrist curls  7. Open and close hands     Leg Exercises:   1. Marches: Bring right knee to chest, then lower. Bring Left knee to chest, then lower. 2. Hip Abduction: Slide R leg out to the side - toes point to ceiling and knee straight, return to start. Then complete on left side. [] No change. [] Other:  [x] Patient would continue to benefit from skilled occupational therapy services in order to decrease swelling in BUE/R breast.                  Therapy Goals:   STG - To be addressed within 4 visits     Pt will demonstrate compliance of maintaining lymphedema precautions to reduce the risks of infection and further exacerbations. Met 3/30/22     Pt will demonstrate independence with decongestive exercise program in order to expedite fluid rerouting.  Met 3/30/22        LTG To be adressed within 8 visits      Pt will demonstrate competence with SELF-MLD ( manual lymphatic drainage) in order to reroute lymphatic pathways for decreased swelling. Met 3/30/22     Pt/Caregiver will demonstrate independence with donning/doffing and wearing schedule for compression garments/ devices to maintain decreased size upon discharge. progressing 3/30/22     Pt to compliant with CDT in order to reduce edema in the BUE by 3+ cm per limb total and in the breast AEB by reduction in palpable firmness to underside of breast. progressing/ongoing 3/30/22              Pt. Education:  [x] Yes  [] No  [x] Reviewed Prior HEP/Ed  Method of Education: [x] Verbal  [x] Demo  [x] Written  Comprehension of Education:  [x] Verbalizes understanding. [x] Demonstrates understanding. [x] Needs review. [] No understanding  Knowledge of home program:  [x] Good [] Fair [] Poor [] With assist from family/caregiver        Plan:   [x] Continue current frequency toward long and short term goals.           Treatment Charges Minutes   Units   Re-evaluation (80034):$   64.01/$49.52                                           Manual Therapy (66464):             $26.92 / $21.34     Therapeutic activities (12828):   $37.46/ $26.79     Therapeutic Exercise (07870):   $29.21/$ 22.84     Self care/home mgmt (64456):    $32.39 / $24.43 30 2   Vasopneumatic Device (83395):   $9.21     Total Treatment Time    30 2     Medicare Tracking - $2150 cap Totals   Today 56.82   Previous  392.11   Grand Total 448.93          Time In:  1430 Time Out: 1500      Electronically signed by Aaliyah Henry OT on 3/30/2022 at 2:37 PM

## 2022-04-27 ENCOUNTER — HOSPITAL ENCOUNTER (OUTPATIENT)
Dept: OCCUPATIONAL THERAPY | Age: 77
Setting detail: THERAPIES SERIES
Discharge: HOME | End: 2022-04-27
Payer: MEDICARE

## 2022-04-27 PROCEDURE — 97535 SELF CARE MNGMENT TRAINING: CPT

## 2022-04-27 NOTE — DISCHARGE SUMMARY
TREATMENT LOCATION:   [x] [address]   [facility]: [phone]  F: [phone] [] [address]: [phone]  F: [phone]        Lymphedema Services - Treatment Note of the Upper Extremity/Discharge    Date:  2022  Patient: Rekha Rivera  : [birth year]             MRN: [id number]  Referring Physician: Anisha Vega MD       Phone: [phone]  Fax: [phone]  Insurance: Medicare ([id number])/ZRD supplement ([id number]) - visits BMN  Medical Diagnosis: D27 - L breast  Rehab Codes: I89.0, I97.2      Onset Date: 22   Visit# / total visits:      Allergies:  [x]? None    Restrictions: No blood pressure/blood draw in: BUE      Contraindications/Precautions:   [x]? Thyroid condition (caution with MLD to neck)  - hyperthyroidism controlled without medication  [x]? Cardiac Arrhythmia (caution with MLD to neck)  [x]? Age 61+ (caution with MLD to neck)      Subjective: Pt has not needed to wear RUE sleeve & wore LUE sleeve daily other than for 3 days when she was not feeling well. Pain: [] YES    [x] NO     Location: n/a      Pain Rating: ( 0-10 scale) : 0/10  Comments:           Objective:   [x] Measurement [] Bandaging [] MLD [] Skin care   [x] Education [] Self-bandaging [] Self-MLD [] Wound Care   [] Exercise [] Caregiver training [] Kinesiotaping [] Other:    [] Nutrition [] Garment fitting/training [] Vasopneumatic Pump           Circumferential Measurements   Measurements taken from nail base of D3 digit. Fingers are measured at the base.    Measurements (cm) Right Left   Dorsum 10 19.4 18.6   Wrist 16 16.7 16.7   Lower Forearm 22 18.3 18.6   Upper Forearm  34 24.0 24.5   Olecranon 39 24.3 25.3   Lower Bicep 46 27.7 29.5   Upper Bicep 54 31.0 31.5   Current: 4/27/22    3/30/21     3/23/22    3/16/22    Initial Total 3/2/2022:   161.4    159.2    159.1    162.4    166.3 164.7    163.3    165.4    166.8    170. 6      Measurements of Chest Wall  Measurements (cm) 3/9/22 3/23/22   axilla 94.0 92.5   Nipple line 101.6                Assessment:  [x] Progressing toward goals. Pt arrives today with LUE sleeve donned. She has been wearing consistently other then when sick for 3 days. She has not needed to wear RUE sleeve. Measurement updates and pt remains similar in size compared to previous visits. Pt remains visibly slightly more full in L forearm compared to RUE. This is likely her baseline. Educated on long term LUE sleeve recommendations with option to continue to wear daily or attempt to gradually wean down. Pt must monitor her symptoms and return to daily wear if necessary. Pt is educated on where/how to obtain replacement garments. She is appropriate for discharge at this time. Local Compression Vendor Options    All listed local vendors have the ability to provide measurements & will verify insurance benefits. They offer self-pay options as well. Location Phone/Fax Garment Types Available Additional [address]  [address] [facility] P: [phone]  F: [phone] Sleeves, gloves, stockings, in-elastic garments, nighttime garments, velcro wraps for upper and lower extremity  Make appointment with Nimo Bautista who can be reached at extension [phone].  This is the only [facility] location that offers this service.  Can accommodate custom orders   [facility] [address] [place] [address] [address]. [facility] Mathias Moritz [facility] P: [phone]  F: [phone] Sleeves, gloves, stockings, nighttime garments, velcro wraps, compression bras, swell spots  Appointment only   Will NOT accept electronically signed scripts   Can accommodate custom orders   [employer]  [address] David [address]win.   [address] P: [phone]  F: [phone] Sleeves, gloves, stockings, velcro wraps ([place] only), compression bras, swell spots, JoViPak for head/neck  Appointment only    Can accommodate custom orders   Out of network with: Gloria Both  [address], [address] P: [phone]  F: [phone] Custom compression sleeves, stockings, tops, pants, chinstraps, and face masks  Appointment only   Custom garments only                                 [] No change. [x] Other: DC  [] Patient would continue to benefit from skilled occupational therapy services in order to decrease swelling in BUE/R breast.                  Therapy Goals:   STG - To be addressed within 4 visits     Pt will demonstrate compliance of maintaining lymphedema precautions to reduce the risks of infection and further exacerbations. Met 3/30/22     Pt will demonstrate independence with decongestive exercise program in order to expedite fluid rerouting. Met 3/30/22        LTG To be adressed within 8 visits      Pt will demonstrate competence with SELF-MLD ( manual lymphatic drainage) in order to reroute lymphatic pathways for decreased swelling. Met 3/30/22     Pt/Caregiver will demonstrate independence with donning/doffing and wearing schedule for compression garments/ devices to maintain decreased size upon discharge. Met 4/27/22     Pt to compliant with CDT in order to reduce edema in the BUE by 3+ cm per limb total and in the breast AEB by reduction in palpable firmness to underside of breast. met/at max potential 4/27/22              Pt. Education:  [x] Yes  [] No  [x] Reviewed Prior HEP/Ed  Method of Education: [x] Verbal  [x] Demo  [x] Written  Comprehension of Education:  [x] Verbalizes understanding. [x] Demonstrates understanding. [] Needs review.   [] No understanding  Knowledge of home program:  [x] Good [] Fair [] Poor [] With assist from family/caregiver        Treatment to Date:  [] Therapeutic Exercise      [] Therapeutic Activity       [x] Instruction in Home Program                       [x] Manual Therapy  [x] Self-Care/Home Management  [] Vasopneumatic Pump             [] Other:    Discharge Status:   [x] Treatment goals were met. [x] Pt received maximum benefit. No further therapy indicated at this time. [x] Pt to continue exercise/home instructions independently.     Functional Assessment Used: Lymphedema Life Impact Scale (LLIS) ( % of impairment) [x] Eval 26 % [x] D/C 15  Brief Fatigue Inventory: [] Eval 3/mild [x] D/C 3/mild                         Treatment Charges Minutes   Units   Re-evaluation (36413):$   64.01/$49.52                                           Manual Therapy (06605):             $26.92 / $21.34     Therapeutic activities (41897):   $37.46/ $26.79     Therapeutic Exercise (40931):   $29.21/$ 22.84     Self care/home mgmt (28629):    $32.39 / $24.43 30 2   Vasopneumatic Device (43243):   $9.21     Total Treatment Time    30 2     Medicare Tracking - $2150 cap Totals   Today 56.82   Previous  448.93   Grand Total 505.75          Time In:  1000 Time Out: 1030      Electronically signed by Darylene Fortis, OT on 4/27/2022 at 10:02 AM

## 2022-06-06 ENCOUNTER — HOSPITAL ENCOUNTER (OUTPATIENT)
Dept: MAMMOGRAPHY | Age: 77
Discharge: HOME | End: 2022-06-08
Payer: MEDICARE

## 2022-06-06 DIAGNOSIS — Z12.31 SCREENING MAMMOGRAM FOR BREAST CANCER: ICD-10-CM

## 2022-06-06 PROCEDURE — 77063 BREAST TOMOSYNTHESIS BI: CPT

## 2022-12-11 ENCOUNTER — HOSPITAL ENCOUNTER (EMERGENCY)
Age: 77
Discharge: HOME | End: 2022-12-11
Attending: EMERGENCY MEDICINE
Payer: MEDICARE

## 2022-12-11 ENCOUNTER — APPOINTMENT (OUTPATIENT)
Dept: GENERAL RADIOLOGY | Age: 77
End: 2022-12-11
Payer: MEDICARE

## 2022-12-11 VITALS
BODY MASS INDEX: 32.02 KG/M2 | HEART RATE: 104 BPM | DIASTOLIC BLOOD PRESSURE: 85 MMHG | HEIGHT: 62 IN | OXYGEN SATURATION: 96 % | TEMPERATURE: 98.1 F | SYSTOLIC BLOOD PRESSURE: 121 MMHG | RESPIRATION RATE: 18 BRPM | WEIGHT: 174 LBS

## 2022-12-11 DIAGNOSIS — J10.1 INFLUENZA A: Primary | ICD-10-CM

## 2022-12-11 LAB
ABSOLUTE EOS #: 0 K/UL (ref 0–0.4)
ABSOLUTE LYMPH #: 1.1 K/UL (ref 1–4.8)
ABSOLUTE MONO #: 0.6 K/UL (ref 0.1–1.2)
ALBUMIN SERPL-MCNC: 4 G/DL (ref 3.5–5.2)
ALBUMIN/GLOBULIN RATIO: 1.1 (ref 1–2.5)
ALP BLD-CCNC: 74 U/L (ref 35–104)
ALT SERPL-CCNC: 31 U/L (ref 5–33)
ANION GAP SERPL CALCULATED.3IONS-SCNC: 14 MMOL/L (ref 9–17)
AST SERPL-CCNC: 38 U/L
BASOPHILS # BLD: 1 % (ref 0–2)
BASOPHILS ABSOLUTE: 0 K/UL (ref 0–0.2)
BILIRUB SERPL-MCNC: 0.8 MG/DL (ref 0.3–1.2)
BUN BLDV-MCNC: 17 MG/DL (ref 8–23)
CALCIUM SERPL-MCNC: 9.7 MG/DL (ref 8.6–10.4)
CHLORIDE BLD-SCNC: 94 MMOL/L (ref 98–107)
CO2: 26 MMOL/L (ref 20–31)
CREAT SERPL-MCNC: 1.18 MG/DL (ref 0.5–0.9)
EOSINOPHILS RELATIVE PERCENT: 0 % (ref 1–4)
FLU A ANTIGEN: POSITIVE
FLU B ANTIGEN: NEGATIVE
GFR SERPL CREATININE-BSD FRML MDRD: 47 ML/MIN/1.73M2
GLUCOSE BLD-MCNC: 98 MG/DL (ref 70–99)
HCT VFR BLD CALC: 42.7 % (ref 36–46)
HEMOGLOBIN: 14.3 G/DL (ref 12–16)
LYMPHOCYTES # BLD: 32 % (ref 24–44)
MAGNESIUM: 1.8 MG/DL (ref 1.6–2.6)
MCH RBC QN AUTO: 30.7 PG (ref 26–34)
MCHC RBC AUTO-ENTMCNC: 33.5 G/DL (ref 31–37)
MCV RBC AUTO: 91.9 FL (ref 80–100)
MONOCYTES # BLD: 17 % (ref 2–11)
PDW BLD-RTO: 14.1 % (ref 12.5–15.4)
PLATELET # BLD: 172 K/UL (ref 140–450)
PMV BLD AUTO: 9.3 FL (ref 6–12)
POTASSIUM SERPL-SCNC: 4.1 MMOL/L (ref 3.7–5.3)
RBC # BLD: 4.65 M/UL (ref 4–5.2)
SARS-COV-2, RAPID: NOT DETECTED
SEG NEUTROPHILS: 50 % (ref 36–66)
SEGMENTED NEUTROPHILS ABSOLUTE COUNT: 1.7 K/UL (ref 1.8–7.7)
SODIUM BLD-SCNC: 134 MMOL/L (ref 135–144)
SPECIMEN DESCRIPTION: NORMAL
TOTAL PROTEIN: 7.6 G/DL (ref 6.4–8.3)
TROPONIN, HIGH SENSITIVITY: 17 NG/L (ref 0–14)
TROPONIN, HIGH SENSITIVITY: 19 NG/L (ref 0–14)
WBC # BLD: 3.4 K/UL (ref 3.5–11)

## 2022-12-11 PROCEDURE — 87804 INFLUENZA ASSAY W/OPTIC: CPT

## 2022-12-11 PROCEDURE — 99285 EMERGENCY DEPT VISIT HI MDM: CPT

## 2022-12-11 PROCEDURE — 36415 COLL VENOUS BLD VENIPUNCTURE: CPT

## 2022-12-11 PROCEDURE — 83735 ASSAY OF MAGNESIUM: CPT

## 2022-12-11 PROCEDURE — 71045 X-RAY EXAM CHEST 1 VIEW: CPT

## 2022-12-11 PROCEDURE — 80053 COMPREHEN METABOLIC PANEL: CPT

## 2022-12-11 PROCEDURE — 2580000003 HC RX 258: Performed by: EMERGENCY MEDICINE

## 2022-12-11 PROCEDURE — 85025 COMPLETE CBC W/AUTO DIFF WBC: CPT

## 2022-12-11 PROCEDURE — 87635 SARS-COV-2 COVID-19 AMP PRB: CPT

## 2022-12-11 PROCEDURE — 84484 ASSAY OF TROPONIN QUANT: CPT

## 2022-12-11 PROCEDURE — 93005 ELECTROCARDIOGRAM TRACING: CPT | Performed by: EMERGENCY MEDICINE

## 2022-12-11 RX ORDER — 0.9 % SODIUM CHLORIDE 0.9 %
500 INTRAVENOUS SOLUTION INTRAVENOUS ONCE
Status: COMPLETED | OUTPATIENT
Start: 2022-12-11 | End: 2022-12-11

## 2022-12-11 RX ADMIN — SODIUM CHLORIDE 500 ML: 9 INJECTION, SOLUTION INTRAVENOUS at 15:18

## 2022-12-11 ASSESSMENT — ENCOUNTER SYMPTOMS
SHORTNESS OF BREATH: 0
ABDOMINAL PAIN: 0

## 2022-12-11 ASSESSMENT — PAIN SCALES - GENERAL: PAINLEVEL_OUTOF10: 6

## 2022-12-11 ASSESSMENT — PAIN - FUNCTIONAL ASSESSMENT: PAIN_FUNCTIONAL_ASSESSMENT: 0-10

## 2022-12-11 ASSESSMENT — PAIN DESCRIPTION - PAIN TYPE: TYPE: ACUTE PAIN

## 2022-12-11 ASSESSMENT — PAIN DESCRIPTION - ORIENTATION: ORIENTATION: LEFT

## 2022-12-11 ASSESSMENT — PAIN DESCRIPTION - LOCATION: LOCATION: ANKLE

## 2022-12-11 NOTE — ED PROVIDER NOTES
[address] [place] [address] ENCOUNTER      Pt Name: Bib Quiroz  MRN: [id number]  [place] 1945  Date of evaluation: 12/11/2022  Provider: Elana Lyle MD    CHIEF COMPLAINT     Chief Complaint   Patient presents with    Cough     PT. STATES HAVING A COUGH FOR THREE DAYS     Fatigue    Wheezing     Pt. States wheezing at night for the last three days. HISTORY OF PRESENT ILLNESS   (Location/Symptom, Timing/Onset, Context/Setting,Quality, Duration, Modifying Factors, Severity)  Note limiting factors. Bib Quiroz is a 65 y.o. female who presents to the emergency department with a 3-day history of cough and congestion with wheezing mostly at night when she lies down. She also reports a low-grade fever. She had a negative home COVID test 4 days ago. She has not had flu vaccine this season. She has a history of atrial fibrillation and takes Rythmol. She was experiencing palpitations yesterday but not today. The history is provided by the patient and medical records. Nursing Notes werereviewed. REVIEW OF SYSTEMS    (2-9 systems for level 4, 10 or more for level 5)     Review of Systems   Respiratory:  Negative for shortness of breath. Cardiovascular:  Negative for chest pain. Gastrointestinal:  Negative for abdominal pain. Neurological:  Negative for syncope. All other systems reviewed and are negative. Except as noted above the remainder of the review of systems was reviewed and negative.        PAST MEDICAL HISTORY     Past Medical History:   Diagnosis Date    A-fib [facility])     h/o afib  Dr[facility]) 2007    RT    Breast cancer ([facility] 2019    LT    Cancer ([facility]     breast,skin    GERD (gastroesophageal reflux disease)     History of therapeutic radiation 2007    Hx antineoplastic chemo 2007    Macular degeneration     Thyroid disease     off medication yrs ago with no problems since (written: 07/05/2019)         SURGICALHISTORY       Past Surgical History:   Procedure Laterality Date    BREAST BIOPSY      BREAST LUMPECTOMY Left 07/15/2019    BREAST LUMPECTOMY Left 7/15/2019    LEFT BREAST LUMPECTOMY WITH WIRE LOCALIZATION @   1130    WITH PLACEMENT OF BIOZORB MARKER performed by Roseline Sesay MD at [facility] LUMPECTOMY Left 08/05/2019    with re-excision of medial and posterior margins with replacement of Biozorb marker    BREAST LUMPECTOMY Right 2007    BREAST SURGERY Left 8/5/2019    LEFT BREAST LUMPECTOMY WITH RE EXCISION MEDIAL AND POSTERIOR MARGINS W/ REPLACMENT OF BIOZORB performed by Roseline Sesay MD at [address] [place] [address] Bilateral     cataract    HYSTERECTOMY (CERVIX STATUS UNKNOWN)      JOINT REPLACEMENT Bilateral     knee    KNEE SURGERY Left     osteotomy    MASTECTOMY Left 09/25/2019    LEFT BREAST SIMPLE  MASTECTOMY SENTINEL NODE DISSECTION (Left Breast    MASTECTOMY Left 9/25/2019    LEFT BREAST SIMPLE  MASTECTOMY SENTINEL NODE DISSECTION performed by Roseline Sesay MD at [address] [place] [address]'s         CURRENT MEDICATIONS       Discharge Medication List as of 12/11/2022  4:24 PM        CONTINUE these medications which have NOT CHANGED    Details   omeprazole (PRILOSEC) 20 MG delayed release capsule Take 20 mg by mouth dailyHistorical Med      propafenone (RYTHMOL) 150 MG tablet Take 150 mg by mouth every 8 hoursHistorical Med      Multiple Vitamins-Minerals (ICAPS AREDS 2 PO) Take 1 tablet by mouth 2 times dailyHistorical Med      Omega-3 Fatty Acids (FISH OIL) 1200 MG CAPS Take 1 capsule by mouth dailyHistorical Med      Multiple Vitamins-Minerals (THERAPEUTIC MULTIVITAMIN-MINERALS) tablet Take 1 tablet by mouth dailyHistorical Med      aspirin 81 MG EC tablet Take 162 mg by mouth nightlyHistorical Med             ALLERGIES     Patient has no known allergies. FAMILY HISTORY     History reviewed. No pertinent family history. SOCIAL HISTORY       Social History     Socioeconomic History    Marital status:      Spouse name: None    Number of children: None    Years of education: None    Highest education level: None   Tobacco Use    Smoking status: Never    Smokeless tobacco: Never   Vaping Use    Vaping Use: Never used   Substance and Sexual Activity    Alcohol use: Never    Drug use: Never       SCREENINGS    Yamila Coma Scale  Eye Opening: Spontaneous  Best Verbal Response: Oriented  Best Motor Response: Obeys commands  Mcfaddin Coma Scale Score: 15        PHYSICAL EXAM    (up to 7 for level 4, 8 or more for level 5)     ED Triage Vitals [12/11/22 1342]   BP Temp Temp src Heart Rate Resp SpO2 Height Weight   121/85 98.1 °F (36.7 °C) -- (!) 104 18 96 % 5' 1.5\" (1.562 m) 174 lb (78.9 kg)       Physical Exam  Vitals reviewed. Constitutional:       General: She is not in acute distress. Appearance: She is not ill-appearing. HENT:      Head: Normocephalic. Right Ear: External ear normal.      Left Ear: External ear normal.      Nose: Nose normal.      Mouth/Throat:      Mouth: Mucous membranes are moist.   Eyes:      Extraocular Movements: Extraocular movements intact. Pupils: Pupils are equal, round, and reactive to light. Cardiovascular:      Rate and Rhythm: Tachycardia present. Rhythm irregular. Heart sounds: Normal heart sounds. Pulmonary:      Effort: Pulmonary effort is normal. No respiratory distress. Breath sounds: Normal breath sounds. Abdominal:      Palpations: Abdomen is soft. Tenderness: There is no abdominal tenderness. Musculoskeletal:         General: No tenderness. Cervical back: Neck supple. Skin:     General: Skin is warm and dry. Coloration: Skin is not pale. Neurological:      General: No focal deficit present.       Mental Status: She is alert and oriented to person, place, and time. Mental status is at baseline. DIAGNOSTIC RESULTS     EKG: All EKG's are interpreted by the Emergency Department Physician who either signs orCo-signs this chart in the absence of a cardiologist.    Atrial fibrillation with rapid ventricular response of 111 bpm.  Normal axis. No acute ST elevation. RADIOLOGY:     Interpretation per the Radiologist below, ifavailable at the time of this note:    XR CHEST PORTABLE   Final Result   No acute process. ED BEDSIDE ULTRASOUND:   Performed by ED Physician - none    LABS:  Labs Reviewed   RAPID INFLUENZA A/B ANTIGENS - Abnormal; Notable for the following components:       Result Value    Flu A Antigen POSITIVE (*)     All other components within normal limits   COMPREHENSIVE METABOLIC PANEL - Abnormal; Notable for the following components:    Creatinine 1.18 (*)     Est, Glom Filt Rate 47 (*)     Sodium 134 (*)     Chloride 94 (*)     AST 38 (*)     All other components within normal limits   CBC WITH AUTO DIFFERENTIAL - Abnormal; Notable for the following components:    WBC 3.4 (*)     Monocytes 17 (*)     Eosinophils % 0 (*)     Segs Absolute 1.70 (*)     All other components within normal limits   TROPONIN - Abnormal; Notable for the following components:    Troponin, High Sensitivity 19 (*)     All other components within normal limits   TROPONIN - Abnormal; Notable for the following components:    Troponin, High Sensitivity 17 (*)     All other components within normal limits   COVID-19, RAPID   MAGNESIUM       All other labs were within normal range ornot returned as of this dictation. EMERGENCY DEPARTMENT COURSE and DIFFERENTIAL DIAGNOSIS/MDM:   Vitals:    Vitals:    12/11/22 1342   BP: 121/85   Pulse: (!) 104   Resp: 18   Temp: 98.1 °F (36.7 °C)   SpO2: 96%   Weight: 78.9 kg (174 lb)   Height: 5' 1.5\" (1.562 m)            Chest x-ray does not show infiltrate. Nasal swab is positive for influenza A antigen.   Cardiac biomarkers are not diagnostically elevated. Patient remains in atrial fibrillation. She has had a slight bump in the creatinine likely due to volume depletion and is advised to drink extra fluids for the next few days. Supportive care is advised and she is discharged in stable condition. She may return anytime for worsening symptoms. MDM    CONSULTS:  None    PROCEDURES:  Unlessotherwise noted below, none     Procedures    FINAL IMPRESSION      1. Influenza A          DISPOSITION/PLAN   DISPOSITION Decision To Discharge 12/11/2022 04:23:31 PM      PATIENT REFERRED TO:  Alonso Sher MD  [address] [place] [address]  [phone]          DISCHARGE MEDICATIONS:         Problem List:  Patient Active Problem List   Diagnosis Code    Breast cancer in situ D05.90           Summation      Patient Course: Discharged    ED Medicationsadministered this visit:    Medications   0.9 % sodium chloride bolus (0 mLs IntraVENous Stopped 12/11/22 1603)       New Prescriptions from this visit:    Discharge Medication List as of 12/11/2022  4:24 PM          Follow-up:  Alonso Sher MD  [address] [facility]  [phone]            Final Impression:   1.  Influenza A               (Please note that portions of this note were completed with a voice recognitionprogram.  Efforts were made to edit the dictations but occasionally words are mis-transcribed.)    Marge Waite MD (electronically signed)  Attending Emergency Physician            Marge Waite MD  12/11/22 5838

## 2022-12-11 NOTE — DISCHARGE INSTRUCTIONS
Tylenol for fever or body aches as needed. Drink extra fluids for the next few days. Return for worsening symptoms.

## 2022-12-13 LAB
EKG ATRIAL RATE: 117 BPM
EKG Q-T INTERVAL: 332 MS
EKG QRS DURATION: 88 MS
EKG QTC CALCULATION (BAZETT): 451 MS
EKG R AXIS: -19 DEGREES
EKG T AXIS: 38 DEGREES
EKG VENTRICULAR RATE: 111 BPM

## 2023-03-31 ENCOUNTER — TELEPHONE (OUTPATIENT)
Dept: PHARMACY | Age: 78
End: 2023-03-31

## 2023-03-31 NOTE — TELEPHONE ENCOUNTER
Received new referral for Anticoagulation Service - warfarin  from Dr. Shirley Gonzalez. Called patient and scheduled patient for 4/4 at 10:45am. She was on warfarin years ago for afib and is being restarted on anticoagulation by Dr. Shirley Gonzalez. She was prescribed a 5mg tablet and will be picking up from the pharmacy today or tomorrow. I instructed her to take 1 tablet daily (5mg) and we will test INR on Tues and provide education .

## 2023-04-04 ENCOUNTER — HOSPITAL ENCOUNTER (OUTPATIENT)
Dept: PHARMACY | Age: 78
Setting detail: THERAPIES SERIES
Discharge: HOME | End: 2023-04-04
Payer: MEDICARE

## 2023-04-04 DIAGNOSIS — I48.91 ATRIAL FIBRILLATION, UNSPECIFIED TYPE (HCC): Primary | ICD-10-CM

## 2023-04-04 LAB
INR BLD: 1.3
PROTIME: 15.2 SECONDS

## 2023-04-04 PROCEDURE — 85610 PROTHROMBIN TIME: CPT

## 2023-04-04 PROCEDURE — 99212 OFFICE O/P EST SF 10 MIN: CPT

## 2023-04-04 NOTE — PROGRESS NOTES
First visit to ACS Office. Patient  was on warfarin many years ago; she just restarted warfarin 5mg daily on Saturday 4/1 . Patient seen in clinic for warfarin management due to atrial fibrillation with an INR goal of 2.0-3.0. Estimated duration of therapy is indefinite. Education provided on indication and mechanism of warfarin, compliance, appropriate follow-up & monitoring, dietary and medication interactions, potential thromboembolic & bleeding complications, when to seek medical care, and office policy. Patient acknowledges working in consult agreement with pharmacist as referred by his/her physician. Patient states  she forgot to take warfarin on Sunday 4/2 but was otherwise compliant on 4/1 and yesterday 4/3 . No bleeding or thromboembolic side effects noted. No significant med or dietary changes. No significant recent illness or disease state changes. PT/INR done in office per protocol. INR is 1.3 which is subtherapeutic. Warfarin regimen will be increased to 10mg Tues/Fri, 5mg all other days until next week. Will retest in 6 days. Patient understands dosing directions and information discussed. Dosing schedule and follow up appointment given to patient. Progress note routed to referring physicians office. Discussed with patient the Pharmacist Collaborative Practice Agreement. Patient provided verbal and/or electronic (ex. [employer]) consent to participate in the collaborative practice agreement between the pharmacist and referred patient. This is in lieu of paper consent due to COVID-19 precautions and the use of remote/virtual visits.        For Pharmacy Admin Tracking Only    Intervention Detail: Dose Adjustment: 1, reason: Therapy Optimization  Total # of Interventions Recommended: 1  Total # of Interventions Accepted: 1  Time Spent (min): 30

## 2023-04-17 ENCOUNTER — HOSPITAL ENCOUNTER (OUTPATIENT)
Dept: PHARMACY | Age: 78
Setting detail: THERAPIES SERIES
Discharge: HOME | End: 2023-04-17
Payer: MEDICARE

## 2023-04-17 DIAGNOSIS — I48.91 ATRIAL FIBRILLATION, UNSPECIFIED TYPE (HCC): Primary | ICD-10-CM

## 2023-04-17 LAB
INR BLD: 4.2
PROTIME: 50.3 SECONDS

## 2023-04-17 PROCEDURE — 85610 PROTHROMBIN TIME: CPT

## 2023-04-17 PROCEDURE — 99212 OFFICE O/P EST SF 10 MIN: CPT

## 2023-04-17 NOTE — PROGRESS NOTES
Patient seen in clinic for warfarin management due to atrial fibrillation with an INR goal of 2.0-3.0. Estimated duration of therapy is indefinite. Patient states compliant all of the time with regimen. No bleeding or thromboembolic side effects noted. No significant med or dietary changes. No significant recent illness or disease state changes. PT/INR done in office per protocol. INR is 4.2 which is supratherapeutic but improved from 6.4. Warfarin regimen will be held for today then continue with 5mg Saturday and 2.5mg all other days (target 17.5mg for the week). Will retest in 1 week. Patient understands dosing directions and information discussed. Dosing schedule and follow up appointment given to patient. Progress note routed to referring physicians office. Discussed with patient the Pharmacist Collaborative Practice Agreement. Patient provided verbal and/or electronic (ex. [employer]) consent to participate in the collaborative practice agreement between the pharmacist and referred patient. This is in lieu of paper consent due to COVID-19 precautions and the use of remote/virtual visits.        For Pharmacy Admin Tracking Only    Intervention Detail: Dose Adjustment: 1, reason: Therapy De-escalation  Total # of Interventions Recommended: 1  Total # of Interventions Accepted: 1  Time Spent (min): 15

## 2023-04-24 ENCOUNTER — HOSPITAL ENCOUNTER (OUTPATIENT)
Dept: PHARMACY | Age: 78
Setting detail: THERAPIES SERIES
Discharge: HOME | End: 2023-04-24
Payer: MEDICARE

## 2023-04-24 DIAGNOSIS — I48.91 ATRIAL FIBRILLATION, UNSPECIFIED TYPE (HCC): Primary | ICD-10-CM

## 2023-04-24 LAB
INR BLD: 6.2
PROTIME: 75 SECONDS

## 2023-04-24 PROCEDURE — 85610 PROTHROMBIN TIME: CPT

## 2023-04-24 PROCEDURE — 99212 OFFICE O/P EST SF 10 MIN: CPT

## 2023-04-24 NOTE — PROGRESS NOTES
Patient seen in clinic for warfarin management due to atrial fibrillation with an INR goal of 2.0-3.0. Estimated duration of therapy is indefinite. Patient states compliant all of the time with regimen. No bleeding or thromboembolic side effects noted. No significant med or dietary changes. No significant recent illness or disease state changes. Counseled patient on incorporating more green, leafy vegetables in her diet to help decrease her INR. PT/INR done in office per protocol. INR is 6.2 which is supratherapeutic - still in adjustment phase. Warfarin regimen will be held for today (4/24) then decreased to 2.5 mg every day. Will retest in 1 week. Patient understands dosing directions and information discussed. Dosing schedule and follow up appointment given to patient. Progress note routed to referring physicians office. Discussed with patient the Pharmacist Collaborative Practice Agreement. Patient provided verbal and/or electronic (ex. [employer]) consent to participate in the collaborative practice agreement between the pharmacist and referred patient. This is in lieu of paper consent due to COVID-19 precautions and the use of remote/virtual visits.        For Pharmacy Admin Tracking Only    Intervention Detail: Dose Adjustment: 1, reason: Therapy De-escalation  Total # of Interventions Recommended: 1  Total # of Interventions Accepted: 1  Time Spent (min): 15

## 2023-04-28 RX ORDER — WARFARIN SODIUM 2.5 MG/1
TABLET ORAL
Qty: 90 TABLET | Refills: 1 | Status: SHIPPED | OUTPATIENT
Start: 2023-04-28

## 2023-05-01 ENCOUNTER — HOSPITAL ENCOUNTER (OUTPATIENT)
Dept: PHARMACY | Age: 78
Setting detail: THERAPIES SERIES
Discharge: HOME | End: 2023-05-01
Payer: MEDICARE

## 2023-05-01 DIAGNOSIS — I48.91 ATRIAL FIBRILLATION, UNSPECIFIED TYPE (HCC): Primary | ICD-10-CM

## 2023-05-01 LAB
INR BLD: 4.4
PROTIME: 52.7 SECONDS

## 2023-05-01 PROCEDURE — 99212 OFFICE O/P EST SF 10 MIN: CPT

## 2023-05-01 PROCEDURE — 85610 PROTHROMBIN TIME: CPT

## 2023-05-01 NOTE — PROGRESS NOTES
Patient seen in clinic for warfarin management due to atrial fibrillation with an INR goal of 2.0-3.0. Estimated duration of therapy is indefinite. Patient states compliant all of the time with regimen. No thromboembolic side effects noted. No significant med or dietary changes. Patient states she lost her balance and fell going up a few steps at her sons house yesterday. Her  then fell as well with his knee striking her ankle which is swollen and sore. She can walk on the ankle and will continue to ice and elevate. Advised if this does not improve to follow up with her PCP. She denies hitting her head or any other bleeding complications. PT/INR done in office per protocol. INR is 4.4 which is supratherapeutic but improved from 6.2. Warfarin regimen will be held for today, then 1.25mg x 1, then continue 2.5mg daily. Will retest in 1 week. Patient understands dosing directions and information discussed. Dosing schedule and follow up appointment given to patient. Progress note routed to referring physicians office. Discussed with patient the Pharmacist Collaborative Practice Agreement. Patient provided verbal and/or electronic (ex. [employer]) consent to participate in the collaborative practice agreement between the pharmacist and referred patient. This is in lieu of paper consent due to COVID-19 precautions and the use of remote/virtual visits.        For Pharmacy Admin Tracking Only    Intervention Detail: Dose Adjustment: 1, reason: Therapy De-escalation  Total # of Interventions Recommended: 1  Total # of Interventions Accepted: 1  Time Spent (min): 15

## 2023-05-08 ENCOUNTER — HOSPITAL ENCOUNTER (OUTPATIENT)
Dept: PHARMACY | Age: 78
Setting detail: THERAPIES SERIES
Discharge: HOME | End: 2023-05-08
Payer: MEDICARE

## 2023-05-08 DIAGNOSIS — I48.91 ATRIAL FIBRILLATION, UNSPECIFIED TYPE (HCC): Primary | ICD-10-CM

## 2023-05-08 LAB
INR BLD: 3.8
PROTIME: 45.4 SECONDS

## 2023-05-08 PROCEDURE — 99212 OFFICE O/P EST SF 10 MIN: CPT

## 2023-05-08 PROCEDURE — 85610 PROTHROMBIN TIME: CPT

## 2023-05-08 NOTE — PROGRESS NOTES
Patient seen in clinic for warfarin management due to atrial fibrillation with an INR goal of 2.0-3.0. Estimated duration of therapy is indefinite. Patient states compliant all of the time with regimen. No bleeding or thromboembolic side effects noted. No significant med or dietary changes. No significant recent illness or disease state changes. PT/INR done in office per protocol. INR is 3.8 which is supratherapeutic but continuing to improve. Warfarin regimen will be decreased to 2.5mg Wed/Sat and 1.25mg all other days. Will retest in 1 week. Patient understands dosing directions and information discussed. Dosing schedule and follow up appointment given to patient. Progress note routed to referring physicians office. Discussed with patient the Pharmacist Collaborative Practice Agreement. Patient provided verbal and/or electronic (ex. [employer]rian[employer]art) consent to participate in the collaborative practice agreement between the pharmacist and referred patient. This is in lieu of paper consent due to COVID-19 precautions and the use of remote/virtual visits.        For Pharmacy Admin Tracking Only    Intervention Detail: Dose Adjustment: 1, reason: Therapy De-escalation  Total # of Interventions Recommended: 1  Total # of Interventions Accepted: 1  Time Spent (min): 15

## 2023-05-15 ENCOUNTER — HOSPITAL ENCOUNTER (OUTPATIENT)
Dept: PHARMACY | Age: 78
Setting detail: THERAPIES SERIES
Discharge: HOME | End: 2023-05-15
Payer: MEDICARE

## 2023-05-15 DIAGNOSIS — I48.91 ATRIAL FIBRILLATION, UNSPECIFIED TYPE (HCC): Primary | ICD-10-CM

## 2023-05-15 LAB
INR BLD: 3
PROTIME: 35.7 SECONDS

## 2023-05-15 PROCEDURE — 85610 PROTHROMBIN TIME: CPT

## 2023-05-15 PROCEDURE — 99211 OFF/OP EST MAY X REQ PHY/QHP: CPT

## 2023-05-15 NOTE — PROGRESS NOTES
Patient seen in clinic for warfarin management due to atrial fibrillation with an INR goal of 2.0-3.0. Estimated duration of therapy is indefinite. Patient states compliant all of the time with regimen. No bleeding or thromboembolic side effects noted. No significant med or dietary changes. No significant recent illness or disease state changes. PT/INR done in office per protocol. INR is 3.0 which is therapeutic. Warfarin regimen will be continued at current dose of 2.5mg Wed/Sat and 1.25mg all other days. Patient will increase dietary vitamin K over the next 2 weeks. Will retest in 2 weeks. Patient understands dosing directions and information discussed. Dosing schedule and follow up appointment given to patient. Progress note routed to referring physicians office. Discussed with patient the Pharmacist Collaborative Practice Agreement. Patient provided verbal and/or electronic (ex. [employer]hart) consent to participate in the collaborative practice agreement between the pharmacist and referred patient. This is in lieu of paper consent due to COVID-19 precautions and the use of remote/virtual visits.        For Pharmacy Admin Tracking Only    Intervention Detail:   Total # of Interventions Recommended: 0  Total # of Interventions Accepted: 0  Time Spent (min): 15

## 2023-05-30 ENCOUNTER — HOSPITAL ENCOUNTER (OUTPATIENT)
Dept: PHARMACY | Age: 78
Setting detail: THERAPIES SERIES
Discharge: HOME | End: 2023-05-30
Payer: MEDICARE

## 2023-05-30 DIAGNOSIS — I48.91 ATRIAL FIBRILLATION, UNSPECIFIED TYPE (HCC): Primary | ICD-10-CM

## 2023-05-30 LAB
INR BLD: 2.2
PROTIME: 26.4 SECONDS

## 2023-05-30 PROCEDURE — 85610 PROTHROMBIN TIME: CPT

## 2023-05-30 PROCEDURE — 99211 OFF/OP EST MAY X REQ PHY/QHP: CPT

## 2023-05-30 NOTE — PROGRESS NOTES
Patient seen in clinic for warfarin management due to atrial fibrillation with an INR goal of 2.0-3.0. Estimated duration of therapy is indefinite. Patient states compliant all of the time with regimen. No bleeding or thromboembolic side effects noted. No significant med or dietary changes. No significant recent illness or disease state changes. PT/INR done in office per protocol. INR is 2.2 which is therapeutic. Warfarin regimen will be continued at current dose 2.5mg Wed/Sat, 1.25mg all other days. Will retest in 3 weeks. Patient understands dosing directions and information discussed. Dosing schedule and follow up appointment given to patient. Progress note routed to referring physicians office. Discussed with patient the Pharmacist Collaborative Practice Agreement. Patient provided verbal and/or electronic (ex. [employer]art) consent to participate in the collaborative practice agreement between the pharmacist and referred patient. This is in lieu of paper consent due to COVID-19 precautions and the use of remote/virtual visits.        For Pharmacy Admin Tracking Only    Intervention Detail:   Total # of Interventions Recommended: 0  Total # of Interventions Accepted: 0  Time Spent (min): 15

## 2023-06-20 ENCOUNTER — HOSPITAL ENCOUNTER (OUTPATIENT)
Dept: PHARMACY | Age: 78
Setting detail: THERAPIES SERIES
Discharge: HOME | End: 2023-06-20
Payer: MEDICARE

## 2023-06-20 ENCOUNTER — HOSPITAL ENCOUNTER (OUTPATIENT)
Dept: MAMMOGRAPHY | Age: 78
Discharge: HOME | End: 2023-06-22
Payer: MEDICARE

## 2023-06-20 DIAGNOSIS — I48.91 ATRIAL FIBRILLATION, UNSPECIFIED TYPE (HCC): Primary | ICD-10-CM

## 2023-06-20 DIAGNOSIS — Z12.31 ENCOUNTER FOR SCREENING MAMMOGRAM FOR MALIGNANT NEOPLASM OF BREAST: ICD-10-CM

## 2023-06-20 DIAGNOSIS — C50.911 MALIGNANT NEOPLASM OF RIGHT FEMALE BREAST, UNSPECIFIED ESTROGEN RECEPTOR STATUS, UNSPECIFIED SITE OF BREAST (HCC): ICD-10-CM

## 2023-06-20 LAB
INR BLD: 2.2
PROTIME: 26.2 SECONDS

## 2023-06-20 PROCEDURE — 85610 PROTHROMBIN TIME: CPT

## 2023-06-20 PROCEDURE — 99211 OFF/OP EST MAY X REQ PHY/QHP: CPT

## 2023-06-20 PROCEDURE — 77063 BREAST TOMOSYNTHESIS BI: CPT

## 2023-06-20 NOTE — PROGRESS NOTES
Patient seen in clinic for warfarin management due to atrial fibrillation with an INR goal of 2.0-3.0. Estimated duration of therapy is indefinite. Patient states compliant all of the time with regimen. No bleeding or thromboembolic side effects noted. No significant med or dietary changes. No significant recent illness or disease state changes. PT/INR done in office per protocol. INR is 2.2 which is therapeutic. Warfarin regimen will be continued at current dose 2.5mg Wed/Sat, 1.25mg all other days. Will retest in 4 weeks. Patient understands dosing directions and information discussed. Dosing schedule and follow up appointment given to patient. Progress note routed to referring physicians office. Discussed with patient the Pharmacist Collaborative Practice Agreement. Patient provided verbal and/or electronic (ex. [employer]art) consent to participate in the collaborative practice agreement between the pharmacist and referred patient. This is in lieu of paper consent due to COVID-19 precautions and the use of remote/virtual visits.        For Pharmacy Admin Tracking Only    Intervention Detail:   Total # of Interventions Recommended: 0  Total # of Interventions Accepted: 0  Time Spent (min): 15

## 2023-07-18 ENCOUNTER — HOSPITAL ENCOUNTER (OUTPATIENT)
Dept: PHARMACY | Age: 78
Setting detail: THERAPIES SERIES
Discharge: HOME | End: 2023-07-18
Payer: MEDICARE

## 2023-07-18 DIAGNOSIS — I48.91 ATRIAL FIBRILLATION, UNSPECIFIED TYPE (HCC): Primary | ICD-10-CM

## 2023-07-18 LAB
INR BLD: 1.3
PROTIME: 15.5 SECONDS

## 2023-07-18 PROCEDURE — 85610 PROTHROMBIN TIME: CPT

## 2023-07-18 PROCEDURE — 99212 OFFICE O/P EST SF 10 MIN: CPT

## 2023-07-18 NOTE — PROGRESS NOTES
Patient seen in clinic for warfarin management due to atrial fibrillation with an INR goal of 2.0-3.0. Estimated duration of therapy is indefinite. Patient states  she missed a 2.5mg dose on Saturday as she has been busy taking care of her  after surgery . No bleeding or thromboembolic side effects noted. No significant med or dietary changes. No significant recent illness or disease state changes. PT/INR done in office per protocol. INR is 1.3 which is subtherapeutic. Warfarin regimen will be 2.5mg tonight then continued at current dose 2.5mg Wed/Sat, 1.25mg all other days. Will retest in 1 week    Patient understands dosing directions and information discussed. Dosing schedule and follow up appointment given to patient. Progress note routed to referring physicians office. Discussed with patient the Pharmacist Collaborative Practice Agreement. Patient provided verbal and/or electronic (ex. [employer]art) consent to participate in the collaborative practice agreement between the pharmacist and referred patient. This is in lieu of paper consent due to COVID-19 precautions and the use of remote/virtual visits.        For Pharmacy Admin Tracking Only    Intervention Detail: Dose Adjustment: 1, reason: Therapy Optimization  Total # of Interventions Recommended: 1  Total # of Interventions Accepted: 1  Time Spent (min): 15

## 2023-07-25 ENCOUNTER — APPOINTMENT (OUTPATIENT)
Dept: PHARMACY | Age: 78
End: 2023-07-25
Payer: MEDICARE

## 2023-07-25 DIAGNOSIS — I48.91 ATRIAL FIBRILLATION, UNSPECIFIED TYPE (HCC): Primary | ICD-10-CM

## 2023-07-25 LAB
INR BLD: 1.8
PROTIME: 22 SECONDS

## 2023-07-25 PROCEDURE — 99211 OFF/OP EST MAY X REQ PHY/QHP: CPT

## 2023-07-25 PROCEDURE — 85610 PROTHROMBIN TIME: CPT

## 2023-07-25 NOTE — PROGRESS NOTES
Patient seen in clinic for warfarin management due to atrial fibrillation with an INR goal of 2.0-3.0. Estimated duration of therapy is indefinite. Patient states compliant all of the time with regimen as instructed last week (took 12.5mg over the last 7 days). No bleeding or thromboembolic side effects noted. No significant med or dietary changes. No significant recent illness or disease state changes. PT/INR done in office per protocol. INR is 1.8 which is just below goal.     Warfarin regimen will be increased to 13.75mg weekly target: 1.25mg Sun/Tues/Thurs, 2.5mg all other days. Will retest in 1 week. Patient understands dosing directions and information discussed. Dosing schedule and follow up appointment given to patient. Progress note routed to referring physicians office. Discussed with patient the Pharmacist Collaborative Practice Agreement. Patient provided verbal and/or electronic (ex. [employer]hart) consent to participate in the collaborative practice agreement between the pharmacist and referred patient. This is in lieu of paper consent due to COVID-19 precautions and the use of remote/virtual visits.        For Pharmacy Admin Tracking Only    Intervention Detail: Dose Adjustment: 1, reason: Therapy Optimization  Total # of Interventions Recommended: 1  Total # of Interventions Accepted: 1  Time Spent (min): 15

## 2023-08-01 ENCOUNTER — HOSPITAL ENCOUNTER (OUTPATIENT)
Dept: PHARMACY | Age: 78
Setting detail: THERAPIES SERIES
Discharge: HOME | End: 2023-08-01
Payer: MEDICARE

## 2023-08-01 DIAGNOSIS — I48.91 ATRIAL FIBRILLATION, UNSPECIFIED TYPE (HCC): Primary | ICD-10-CM

## 2023-08-01 LAB
INR BLD: 2
PROTIME: 24.3 SECONDS

## 2023-08-01 PROCEDURE — 99211 OFF/OP EST MAY X REQ PHY/QHP: CPT

## 2023-08-01 PROCEDURE — 85610 PROTHROMBIN TIME: CPT

## 2023-08-01 NOTE — PROGRESS NOTES
Patient seen in clinic for warfarin management due to atrial fibrillation with an INR goal of 2.0-3.0. Estimated duration of therapy is indefinite. Patient states compliant all of the time with regimen  No bleeding or thromboembolic side effects noted. No significant med or dietary changes. No significant recent illness or disease state changes. PT/INR done in office per protocol. INR is 2.0 which is therapeutic. Warfarin regimen will be continued with 1.25mg Sun/Tues/Thurs, 2.5mg all other days. Will retest in 2 weeks. Patient understands dosing directions and information discussed. Dosing schedule and follow up appointment given to patient. Progress note routed to referring physicians office. Discussed with patient the Pharmacist Collaborative Practice Agreement. Patient provided verbal and/or electronic (ex. [employer]) consent to participate in the collaborative practice agreement between the pharmacist and referred patient. This is in lieu of paper consent due to COVID-19 precautions and the use of remote/virtual visits.        For Pharmacy Admin Tracking Only    Intervention Detail:   Total # of Interventions Recommended: 0  Total # of Interventions Accepted: 0  Time Spent (min): 15

## 2023-08-16 ENCOUNTER — HOSPITAL ENCOUNTER (OUTPATIENT)
Dept: PHARMACY | Age: 78
Setting detail: THERAPIES SERIES
Discharge: HOME | End: 2023-08-16
Payer: MEDICARE

## 2023-08-16 DIAGNOSIS — I48.91 ATRIAL FIBRILLATION, UNSPECIFIED TYPE (HCC): Primary | ICD-10-CM

## 2023-08-16 LAB
INR BLD: 2.4
PROTIME: 29.3 SECONDS

## 2023-08-16 PROCEDURE — 99211 OFF/OP EST MAY X REQ PHY/QHP: CPT

## 2023-08-16 PROCEDURE — 85610 PROTHROMBIN TIME: CPT

## 2023-08-16 RX ORDER — WARFARIN SODIUM 2.5 MG/1
1.25-2.5 TABLET ORAL DAILY
Qty: 90 TABLET | Refills: 1 | Status: SHIPPED | OUTPATIENT
Start: 2023-08-16

## 2023-08-16 NOTE — PROGRESS NOTES
Patient seen in clinic for warfarin management due to atrial fibrillation with an INR goal of 2.0-3.0. Estimated duration of therapy is indefinite. Patient states compliant all of the time with regimen. No bleeding or thromboembolic side effects noted. No significant med or dietary changes. No significant recent illness or disease state changes. PT/INR done in office per protocol. INR is 2.4 which is therapeutic. Warfarin regimen will be continued at current dose 1.25mg Sun/Tues/Thurs, 2.5mg all other days. Refill for 2.5mg tabs to [facility] per her request.  Will retest in 4 weeks. Patient understands dosing directions and information discussed. Dosing schedule and follow up appointment given to patient. Progress note routed to referring physicians office. Discussed with patient the Pharmacist Collaborative Practice Agreement. Patient provided verbal and/or electronic (ex. [employer]art) consent to participate in the collaborative practice agreement between the pharmacist and referred patient. This is in lieu of paper consent due to COVID-19 precautions and the use of remote/virtual visits.        For Pharmacy Admin Tracking Only    Intervention Detail: Refill(s) Provided  Total # of Interventions Recommended: 1  Total # of Interventions Accepted: 1  Time Spent (min): 15

## 2023-08-17 ENCOUNTER — APPOINTMENT (OUTPATIENT)
Dept: PHARMACY | Age: 78
End: 2023-08-17
Payer: MEDICARE

## 2023-09-13 ENCOUNTER — HOSPITAL ENCOUNTER (OUTPATIENT)
Dept: PHARMACY | Age: 78
Setting detail: THERAPIES SERIES
Discharge: HOME | End: 2023-09-13
Payer: MEDICARE

## 2023-09-13 DIAGNOSIS — I48.91 ATRIAL FIBRILLATION, UNSPECIFIED TYPE (HCC): Primary | ICD-10-CM

## 2023-09-13 LAB
INR BLD: 2.6
PROTIME: 31.2 SECONDS

## 2023-09-13 PROCEDURE — 99211 OFF/OP EST MAY X REQ PHY/QHP: CPT

## 2023-09-13 PROCEDURE — 85610 PROTHROMBIN TIME: CPT

## 2023-09-13 NOTE — PROGRESS NOTES
Patient seen in clinic for warfarin management due to atrial fibrillation with an INR goal of 2.0-3.0. Estimated duration of therapy is indefinite. Patient states compliant all of the time with regimen. No bleeding or thromboembolic side effects noted. No significant med or dietary changes. No significant recent illness or disease state changes. PT/INR done in office per protocol. INR is 2.6 which is therapeutic. Warfarin regimen will be continued at current dose 1.25mg Sun/Tue/Thurs, 2.5mg all other days. Will retest in 5 weeks. Patient understands dosing directions and information discussed. Dosing schedule and follow up appointment given to patient. Progress note routed to referring physicians office. Discussed with patient the Pharmacist Collaborative Practice Agreement. Patient provided verbal and/or electronic (ex. [employer]hart) consent to participate in the collaborative practice agreement between the pharmacist and referred patient. This is in lieu of paper consent due to COVID-19 precautions and the use of remote/virtual visits.        For Pharmacy Admin Tracking Only    Intervention Detail:   Total # of Interventions Recommended: 0  Total # of Interventions Accepted: 0  Time Spent (min): 15

## 2023-10-18 ENCOUNTER — HOSPITAL ENCOUNTER (OUTPATIENT)
Dept: PHARMACY | Age: 78
Setting detail: THERAPIES SERIES
Discharge: HOME | End: 2023-10-18
Payer: MEDICARE

## 2023-10-18 DIAGNOSIS — I48.91 ATRIAL FIBRILLATION, UNSPECIFIED TYPE (HCC): Primary | ICD-10-CM

## 2023-10-18 LAB
INR BLD: 3.3
PROTIME: 39.7 SECONDS

## 2023-10-18 PROCEDURE — 99212 OFFICE O/P EST SF 10 MIN: CPT

## 2023-10-18 PROCEDURE — 85610 PROTHROMBIN TIME: CPT

## 2023-10-18 NOTE — PROGRESS NOTES
Patient seen in clinic for warfarin management due to atrial fibrillation with an INR goal of 2.0-3.0. Estimated duration of therapy is indefinite. Patient states compliant all of the time with regimen. No bleeding or thromboembolic side effects noted. No significant med or dietary changes. No significant recent illness or disease state changes. PT/INR done in office per protocol. INR is 3.3 which is supratherapeutic. Warfarin regimen will be reduced to 1.25 mg today, then resume usual regimen of 1.25 mg every Sun/Tue/Thu; 2.5 mg all other days  Will retest in 2 weeks. Patient understands dosing directions and information discussed. Dosing schedule and follow up appointment given to patient. Progress note routed to referring physicians office. Discussed with patient the Pharmacist Collaborative Practice Agreement. Patient provided verbal and/or electronic (ex. [employer]hart) consent to participate in the collaborative practice agreement between the pharmacist and referred patient. This is in lieu of paper consent due to COVID-19 precautions and the use of remote/virtual visits.        For Pharmacy Admin Tracking Only    Intervention Detail: Dose Adjustment: 1, reason: Therapy Optimization  Total # of Interventions Recommended: 1  Total # of Interventions Accepted: 1  Time Spent (min): 15

## 2023-10-31 ENCOUNTER — HOSPITAL ENCOUNTER (OUTPATIENT)
Dept: PHARMACY | Age: 78
Setting detail: THERAPIES SERIES
Discharge: HOME | End: 2023-10-31
Payer: MEDICARE

## 2023-10-31 DIAGNOSIS — I48.91 ATRIAL FIBRILLATION, UNSPECIFIED TYPE (HCC): Primary | ICD-10-CM

## 2023-10-31 LAB
INR BLD: 3.5
PROTIME: 42 SECONDS

## 2023-10-31 PROCEDURE — 85610 PROTHROMBIN TIME: CPT

## 2023-10-31 PROCEDURE — 99212 OFFICE O/P EST SF 10 MIN: CPT

## 2023-10-31 NOTE — PROGRESS NOTES
Patient seen in clinic for warfarin management due to atrial fibrillation with an INR goal of 2.0-3.0. Estimated duration of therapy is indefinite. Patient states compliant all of the time with regimen. No bleeding or thromboembolic side effects noted. No significant med. Patient notes having a cold, but she is not taking anything for it. She is not eating as well due to the cold. No significant disease state changes. PT/INR done in office per protocol. INR is 3.5 which is supratherapeutic. Warfarin regimen will be to hold today's dose, then reduce to 2.5mg every Mon/Wed/Fri; 1.25mg all other days. Will retest in 2 weeks. Patient understands dosing directions and information discussed. Dosing schedule and follow up appointment given to patient. Progress note routed to referring physicians office. Discussed with patient the Pharmacist Collaborative Practice Agreement. Patient provided verbal and/or electronic (ex. Todayticketshart) consent to participate in the collaborative practice agreement between the pharmacist and referred patient. This is in lieu of paper consent due to COVID-19 precautions and the use of remote/virtual visits.        For Pharmacy Admin Tracking Only    Intervention Detail: Dose Adjustment: 1, reason: Therapy De-escalation  Total # of Interventions Recommended: 1  Total # of Interventions Accepted: 1  Time Spent (min): 15

## 2023-11-14 ENCOUNTER — HOSPITAL ENCOUNTER (OUTPATIENT)
Dept: PHARMACY | Age: 78
Setting detail: THERAPIES SERIES
Discharge: HOME | End: 2023-11-14
Payer: MEDICARE

## 2023-11-14 DIAGNOSIS — I48.91 ATRIAL FIBRILLATION, UNSPECIFIED TYPE (HCC): Primary | ICD-10-CM

## 2023-11-14 LAB
INR BLD: 1.4
PROTIME: 16.6 SECONDS

## 2023-11-14 PROCEDURE — 99212 OFFICE O/P EST SF 10 MIN: CPT

## 2023-11-14 PROCEDURE — 85610 PROTHROMBIN TIME: CPT

## 2023-11-14 NOTE — PROGRESS NOTES
Patient seen in clinic for warfarin management due to atrial fibrillation with an INR goal of 2.0-3.0. Estimated duration of therapy is indefinite. Patient states compliant all of the time with regimen. No bleeding or thromboembolic side effects noted. No significant med or dietary changes. No significant recent illness or disease state changes. PT/INR done in office per protocol. INR is 1.4 which is subtherapeutic. Warfarin regimen will be 2.5mg boost today, then increased to 1.25mg Sun/Tues/Thurs, 2.5mg all other days. Will retest in 2 weeks. Patient understands dosing directions and information discussed. Dosing schedule and follow up appointment given to patient. Progress note routed to referring physicians office. Discussed with patient the Pharmacist Collaborative Practice Agreement. Patient provided verbal and/or electronic (ex. [employer]art) consent to participate in the collaborative practice agreement between the pharmacist and referred patient. This is in lieu of paper consent due to COVID-19 precautions and the use of remote/virtual visits.        For Pharmacy Admin Tracking Only    Intervention Detail: Dose Adjustment: 1, reason: Therapy Optimization  Total # of Interventions Recommended: 1  Total # of Interventions Accepted: 1  Time Spent (min): 15

## 2023-11-28 ENCOUNTER — HOSPITAL ENCOUNTER (OUTPATIENT)
Dept: PHARMACY | Age: 78
Setting detail: THERAPIES SERIES
Discharge: HOME | End: 2023-11-28
Payer: MEDICARE

## 2023-11-28 DIAGNOSIS — I48.91 ATRIAL FIBRILLATION, UNSPECIFIED TYPE (HCC): Primary | ICD-10-CM

## 2023-11-28 LAB
INR BLD: 2.5
PROTIME: 29.4 SECONDS

## 2023-11-28 PROCEDURE — 85610 PROTHROMBIN TIME: CPT

## 2023-11-28 PROCEDURE — 99211 OFF/OP EST MAY X REQ PHY/QHP: CPT

## 2023-11-28 NOTE — PROGRESS NOTES
Patient seen in clinic for warfarin management due to atrial fibrillation with an INR goal of 2.0-3.0. Estimated duration of therapy is indefinite. Patient states compliant all of the time with regimen. No bleeding or thromboembolic side effects noted. No significant med or dietary changes. No significant recent illness or disease state changes. PT/INR done in office per protocol. INR is 2.5 which is therapeutic. Warfarin regimen will be continued at current dose 1.25mg Sun/Tues/Thur, 2.5mg all other days. Will retest in 3 weeks. Patient understands dosing directions and information discussed. Dosing schedule and follow up appointment given to patient. Progress note routed to referring physicians office. Discussed with patient the Pharmacist Collaborative Practice Agreement. Patient provided verbal and/or electronic (ex. [employer]hart) consent to participate in the collaborative practice agreement between the pharmacist and referred patient. This is in lieu of paper consent due to COVID-19 precautions and the use of remote/virtual visits.        For Pharmacy Admin Tracking Only    Intervention Detail:   Total # of Interventions Recommended: 0  Total # of Interventions Accepted: 0  Time Spent (min): 15

## 2023-12-29 ENCOUNTER — HOSPITAL ENCOUNTER (OUTPATIENT)
Dept: PHARMACY | Age: 78
Setting detail: THERAPIES SERIES
Discharge: HOME | End: 2023-12-29
Payer: MEDICARE

## 2023-12-29 DIAGNOSIS — I48.91 ATRIAL FIBRILLATION, UNSPECIFIED TYPE (HCC): Primary | ICD-10-CM

## 2023-12-29 LAB
INR BLD: 1.7
PROTIME: 20.8 SECONDS

## 2023-12-29 PROCEDURE — 99212 OFFICE O/P EST SF 10 MIN: CPT

## 2023-12-29 PROCEDURE — 85610 PROTHROMBIN TIME: CPT

## 2023-12-29 NOTE — PROGRESS NOTES
Patient seen in clinic for warfarin management due to atrial fibrillation with an INR goal of 2.0-3.0. Estimated duration of therapy is indefinite. Patient states she missed a dose earlier this week but was otherwise compliant. No bleeding or thromboembolic side effects noted. No significant med changes. She reports not feeling well this week and had decreased appetite until yesterday along with nasal congestion. PT/INR done in office per protocol. INR is 1.7 which is subtherapeutic likely from recent missed dose. Warfarin regimen will be 3.75mg boost today, then resume usual regimen of 1.25mg Sun/Tues/Thurs, 2.5mg all other days. Will retest in 3 weeks. Patient understands dosing directions and information discussed. Dosing schedule and follow up appointment given to patient. Progress note routed to referring physicians office. Discussed with patient the Pharmacist Collaborative Practice Agreement. Patient provided verbal and/or electronic (ex. [employer]hart) consent to participate in the collaborative practice agreement between the pharmacist and referred patient. This is in lieu of paper consent due to COVID-19 precautions and the use of remote/virtual visits.        For Pharmacy Admin Tracking Only    Intervention Detail: Dose Adjustment: 1, reason: Therapy Optimization  Total # of Interventions Recommended: 1  Total # of Interventions Accepted: 1  Time Spent (min): 15

## 2024-01-16 ENCOUNTER — HOSPITAL ENCOUNTER (OUTPATIENT)
Dept: PHARMACY | Age: 79
Setting detail: THERAPIES SERIES
Discharge: HOME | End: 2024-01-16
Payer: MEDICARE

## 2024-01-16 DIAGNOSIS — I48.91 ATRIAL FIBRILLATION, UNSPECIFIED TYPE (HCC): Primary | ICD-10-CM

## 2024-01-16 LAB
INR BLD: 3
PROTIME: 36.1 SECONDS

## 2024-01-16 PROCEDURE — 99211 OFF/OP EST MAY X REQ PHY/QHP: CPT

## 2024-01-16 PROCEDURE — 85610 PROTHROMBIN TIME: CPT

## 2024-01-16 NOTE — PROGRESS NOTES
Patient seen in clinic for warfarin management due to atrial fibrillation with an INR goal of 2.0-3.0.  Estimated duration of therapy is indefinite.     Patient states compliant all of the time with regimen.  No bleeding or thromboembolic side effects noted.  No significant med or dietary changes.  No significant recent illness or disease state changes.      PT/INR done in office per protocol.  INR is 3 which is therapeutic.     Warfarin regimen will be continued at current dose 1.25mg on Sun, Tues, Thurs & 2.5mg all other days. She was also advised to eat more dark green veggies if she wants to help keep INR mid-range.  Will retest in 4 weeks.    Patient understands dosing directions and information discussed. Dosing schedule and follow up appointment given to patient.   Progress note routed to referring physicians office. Discussed with patient the Pharmacist Collaborative Practice Agreement.  Patient provided verbal and/or electronic (ex. Alfres[employer]art) consent to participate in the collaborative practice agreement between the pharmacist and referred patient. This is in lieu of paper consent due to COVID-19 precautions and the use of remote/virtual visits.       For Pharmacy Admin Tracking Only    Intervention Detail:   Total # of Interventions Recommended: 0  Total # of Interventions Accepted: 0  Time Spent (min): 15

## 2024-02-13 ENCOUNTER — HOSPITAL ENCOUNTER (OUTPATIENT)
Dept: PHARMACY | Age: 79
Setting detail: THERAPIES SERIES
Discharge: HOME | End: 2024-02-13
Payer: MEDICARE

## 2024-02-13 DIAGNOSIS — I48.91 ATRIAL FIBRILLATION, UNSPECIFIED TYPE (HCC): Primary | ICD-10-CM

## 2024-02-13 LAB
INR BLD: 3.5
PROTIME: 42 SECONDS

## 2024-02-13 PROCEDURE — 85610 PROTHROMBIN TIME: CPT

## 2024-02-13 PROCEDURE — 99212 OFFICE O/P EST SF 10 MIN: CPT

## 2024-02-13 NOTE — PROGRESS NOTES
Patient seen in clinic for warfarin management due to atrial fibrillation with an INR goal of 2.0-3.0.  Estimated duration of therapy is indefinite.     Patient states compliant all of the time with regimen.  No bleeding or thromboembolic side effects noted.  No significant med or dietary changes.  No significant recent illness or disease state changes.  She has been experiencing sinus congestion/drainage and states her appetite has decreased due to these recent symptoms.    PT/INR done in office per protocol.  INR is 3.5 which is supratherapeutic likely due to decreased appetite.     Warfarin regimen will be held for today then resume usual regimen 1.25mg Sun/Tues/Thurs, 2.5mg all other days.  Will retest in 2 weeks to assess.    Patient understands dosing directions and information discussed. Dosing schedule and follow up appointment given to patient.   Progress note routed to referring physicians office. Discussed with patient the Pharmacist Collaborative Practice Agreement.  Patient provided verbal and/or electronic (ex. Net[employer]iahart) consent to participate in the collaborative practice agreement between the pharmacist and referred patient.     For Pharmacy Admin Tracking Only    Intervention Detail: Dose Adjustment: 1, reason: Therapy De-escalation  Total # of Interventions Recommended: 1  Total # of Interventions Accepted: 1  Time Spent (min): 15

## 2024-02-27 ENCOUNTER — APPOINTMENT (OUTPATIENT)
Dept: PHARMACY | Age: 79
End: 2024-02-27
Payer: MEDICARE

## 2024-02-27 DIAGNOSIS — I48.91 ATRIAL FIBRILLATION, UNSPECIFIED TYPE (HCC): Primary | ICD-10-CM

## 2024-02-27 LAB
INR BLD: 2.7
PROTIME: 32.6 SECONDS

## 2024-02-27 PROCEDURE — 85610 PROTHROMBIN TIME: CPT

## 2024-02-27 PROCEDURE — 99211 OFF/OP EST MAY X REQ PHY/QHP: CPT

## 2024-02-27 NOTE — PROGRESS NOTES
Patient seen in clinic for warfarin management due to atrial fibrillation with an INR goal of 2.0-3.0.  Estimated duration of therapy is indefinite.     Patient states compliant all of the time with regimen.  No bleeding or thromboembolic side effects noted.  No significant med or dietary changes.  No significant recent illness or disease state changes.      PT/INR done in office per protocol.  INR is 2.7 which is therapeutic.     Warfarin regimen will be continued at current dose 1.25mg Sun/Tues/Thurs, 2.5mg all other days.  Will retest in 4 weeks.    Patient understands dosing directions and information discussed. Dosing schedule and follow up appointment given to patient.   Progress note routed to referring physicians office. Discussed with patient the Pharmacist Collaborative Practice Agreement.  Patient provided verbal and/or electronic (ex. [employer]) consent to participate in the collaborative practice agreement between the pharmacist and referred patient.     For Pharmacy Admin Tracking Only    Intervention Detail:   Total # of Interventions Recommended: 0  Total # of Interventions Accepted: 0  Time Spent (min): 15

## 2024-03-26 ENCOUNTER — HOSPITAL ENCOUNTER (OUTPATIENT)
Dept: PHARMACY | Age: 79
Setting detail: THERAPIES SERIES
Discharge: HOME | End: 2024-03-26
Payer: MEDICARE

## 2024-03-26 DIAGNOSIS — I48.91 ATRIAL FIBRILLATION, UNSPECIFIED TYPE (HCC): Primary | ICD-10-CM

## 2024-03-26 LAB
INR BLD: 3
PROTIME: 35.7 SECONDS

## 2024-03-26 PROCEDURE — 99211 OFF/OP EST MAY X REQ PHY/QHP: CPT

## 2024-03-26 PROCEDURE — 85610 PROTHROMBIN TIME: CPT

## 2024-03-26 NOTE — PROGRESS NOTES
Patient seen in clinic for warfarin management due to atrial fibrillation with an INR goal of 2.0-3.0.  Estimated duration of therapy is indefinite.     Patient states compliant all of the time with regimen.  No bleeding or thromboembolic side effects noted.  No significant med or dietary changes.  No significant recent illness or disease state changes.      PT/INR done in office per protocol.  INR is 3.0 which is therapeutic.     Warfarin regimen will be continued at current dose 1.25 mg every Sun, Tues, Thurs; 2.5 mg all other days. Encouraged patient to increase her dietary vitamin K intake.  Will retest in 4 weeks.    Patient understands dosing directions and information discussed. Dosing schedule and follow up appointment given to patient.   Progress note routed to referring physicians office. Discussed with patient the Pharmacist Collaborative Practice Agreement.  Patient provided verbal and/or electronic (ex. [employer]hart) consent to participate in the collaborative practice agreement between the pharmacist and referred patient.     For Pharmacy Admin Tracking Only    Intervention Detail:   Total # of Interventions Recommended: 0  Total # of Interventions Accepted: 0  Time Spent (min): 15

## 2024-04-23 ENCOUNTER — HOSPITAL ENCOUNTER (OUTPATIENT)
Dept: PHARMACY | Age: 79
Setting detail: THERAPIES SERIES
Discharge: HOME | End: 2024-04-23
Payer: MEDICARE

## 2024-04-23 DIAGNOSIS — I48.91 ATRIAL FIBRILLATION, UNSPECIFIED TYPE (HCC): Primary | ICD-10-CM

## 2024-04-23 LAB
INR BLD: 2.5
PROTIME: 29.9 SECONDS

## 2024-04-23 PROCEDURE — 99211 OFF/OP EST MAY X REQ PHY/QHP: CPT

## 2024-04-23 PROCEDURE — 85610 PROTHROMBIN TIME: CPT

## 2024-04-23 NOTE — PROGRESS NOTES
Patient seen in clinic for warfarin management due to atrial fibrillation with an INR goal of 2.0-3.0.  Estimated duration of therapy is indefinite.     Patient states compliant all of the time with regimen.  No bleeding or thromboembolic side effects noted.  No significant med or dietary changes.  No significant recent illness or disease state changes.      PT/INR done in office per protocol.  INR is 2.5 which is therapeutic.     Warfarin regimen will be continued at current dose 1.25mg Sun/Tues/Thurs, 2.5mg all other days.  Will retest in 4 weeks.    Patient understands dosing directions and information discussed. Dosing schedule and follow up appointment given to patient.   Progress note routed to referring physicians office. Discussed with patient the Pharmacist Collaborative Practice Agreement.  Patient provided verbal and/or electronic (ex. [employer]) consent to participate in the collaborative practice agreement between the pharmacist and referred patient.     For Pharmacy Admin Tracking Only    Intervention Detail:   Total # of Interventions Recommended: 0  Total # of Interventions Accepted: 0  Time Spent (min): 15

## 2024-05-13 DIAGNOSIS — I48.91 ATRIAL FIBRILLATION, UNSPECIFIED TYPE (HCC): Primary | ICD-10-CM

## 2024-05-13 RX ORDER — WARFARIN SODIUM 2.5 MG/1
1.25-2.5 TABLET ORAL DAILY
Qty: 90 TABLET | Refills: 1 | Status: SHIPPED | OUTPATIENT
Start: 2024-05-13

## 2024-05-13 NOTE — TELEPHONE ENCOUNTER
Refill sent to pharmacy per request    For Pharmacy Admin Tracking Only    Intervention Detail: Refill(s) Provided  Total # of Interventions Recommended: 1  Total # of Interventions Accepted: 1  Time Spent (min): 5

## 2024-05-21 ENCOUNTER — HOSPITAL ENCOUNTER (OUTPATIENT)
Dept: PHARMACY | Age: 79
Setting detail: THERAPIES SERIES
Discharge: HOME | End: 2024-05-21
Payer: MEDICARE

## 2024-05-21 DIAGNOSIS — I48.91 ATRIAL FIBRILLATION, UNSPECIFIED TYPE (HCC): Primary | ICD-10-CM

## 2024-05-21 LAB
INR BLD: 2.5
PROTIME: 30.4 SECONDS

## 2024-05-21 PROCEDURE — 85610 PROTHROMBIN TIME: CPT | Performed by: PHARMACIST

## 2024-05-21 PROCEDURE — 99211 OFF/OP EST MAY X REQ PHY/QHP: CPT | Performed by: PHARMACIST

## 2024-05-21 NOTE — PROGRESS NOTES
Patient seen in clinic for warfarin management due to atrial fibrillation with an INR goal of 2.0-3.0.  Estimated duration of therapy is indefinite.     Patient states compliant all of the time with regimen.  No bleeding or thromboembolic side effects noted.  No significant med or dietary changes.  No significant recent illness or disease state changes.      PT/INR done in office per protocol.  INR is 2.5 which is therapeutic.     Warfarin regimen will be continued at current dose 1.25mg Sun/Tues/Thurs, 2.5mg all other days.  Will retest in 4 weeks.    Patient understands dosing directions and information discussed. Dosing schedule and follow up appointment given to patient.   Progress note routed to referring physicians office. Discussed with patient the Pharmacist Collaborative Practice Agreement.  Patient provided verbal and/or electronic (ex. [email or web address]) consent to participate in the collaborative practice agreement between the pharmacist and referred patient.     For Pharmacy Admin Tracking Only    Intervention Detail:   Total # of Interventions Recommended: 0  Total # of Interventions Accepted: 0  Time Spent (min): 15

## 2024-06-18 ENCOUNTER — TELEPHONE (OUTPATIENT)
Dept: PHARMACY | Age: 79
End: 2024-06-18

## 2024-06-18 ENCOUNTER — HOSPITAL ENCOUNTER (OUTPATIENT)
Dept: PHARMACY | Age: 79
Setting detail: THERAPIES SERIES
Discharge: HOME | End: 2024-06-18
Payer: MEDICARE

## 2024-06-18 DIAGNOSIS — I48.91 ATRIAL FIBRILLATION, UNSPECIFIED TYPE (HCC): Primary | ICD-10-CM

## 2024-06-18 LAB
INR BLD: 2.8
PROTIME: 33.2 SECONDS

## 2024-06-18 PROCEDURE — 85610 PROTHROMBIN TIME: CPT

## 2024-06-18 PROCEDURE — 99211 OFF/OP EST MAY X REQ PHY/QHP: CPT

## 2024-06-18 NOTE — PROGRESS NOTES
Patient seen in clinic for warfarin management due to atrial fibrillation with an INR goal of 2.0-3.0.  Estimated duration of therapy is indefinite.     Patient states compliant all of the time with regimen.  No bleeding or thromboembolic side effects noted.  No significant med or dietary changes.  No significant recent illness or disease state changes.      PT/INR done in office per protocol.  INR is 2.8 which is therapeutic.     Warfarin regimen will be continued at current dose 1.25mg Sunday/Tuesday/Thursday and 2.5mg all other days.  Will retest in 5 weeks.    Patient understands dosing directions and information discussed. Dosing schedule and follow up appointment given to patient.   Progress note routed to referring physicians office. Discussed with patient the Pharmacist Collaborative Practice Agreement.  Patient provided verbal and/or electronic (ex. [employer]) consent to participate in the collaborative practice agreement between the pharmacist and referred patient.     For Pharmacy Admin Tracking Only    Intervention Detail:   Total # of Interventions Recommended: 0  Total # of Interventions Accepted: 0  Time Spent (min): 15

## 2024-06-18 NOTE — TELEPHONE ENCOUNTER
Patient was a no call/no show for 11:15am appointment today. LVM asking patient to call us back to reschedule appt for later today or later this week.

## 2024-06-21 ENCOUNTER — HOSPITAL ENCOUNTER (OUTPATIENT)
Dept: MAMMOGRAPHY | Age: 79
End: 2024-06-21
Attending: INTERNAL MEDICINE
Payer: MEDICARE

## 2024-06-21 VITALS — HEIGHT: 61 IN | BODY MASS INDEX: 33.23 KG/M2 | WEIGHT: 176 LBS

## 2024-06-21 DIAGNOSIS — Z12.31 ENCOUNTER FOR SCREENING MAMMOGRAM FOR MALIGNANT NEOPLASM OF BREAST: ICD-10-CM

## 2024-06-21 DIAGNOSIS — C50.811 MALIGNANT NEOPLASM OF OVERLAPPING SITES OF RIGHT FEMALE BREAST, UNSPECIFIED ESTROGEN RECEPTOR STATUS (HCC): ICD-10-CM

## 2024-06-21 DIAGNOSIS — Z78.0 ASYMPTOMATIC MENOPAUSAL STATE: ICD-10-CM

## 2024-06-21 DIAGNOSIS — Z79.01 LONG TERM (CURRENT) USE OF ANTICOAGULANTS: ICD-10-CM

## 2024-06-21 DIAGNOSIS — Z12.39 ENCOUNTER FOR OTHER SCREENING FOR MALIGNANT NEOPLASM OF BREAST: ICD-10-CM

## 2024-06-21 PROCEDURE — 77063 BREAST TOMOSYNTHESIS BI: CPT

## 2024-06-21 PROCEDURE — 77080 DXA BONE DENSITY AXIAL: CPT

## 2024-07-24 ENCOUNTER — HOSPITAL ENCOUNTER (OUTPATIENT)
Dept: PHARMACY | Age: 79
Setting detail: THERAPIES SERIES
Discharge: HOME | End: 2024-07-24
Payer: MEDICARE

## 2024-07-24 DIAGNOSIS — I48.91 ATRIAL FIBRILLATION, UNSPECIFIED TYPE (HCC): Primary | ICD-10-CM

## 2024-07-24 LAB
INR BLD: 3.8
PROTIME: 45.8 SECONDS

## 2024-07-24 PROCEDURE — 99212 OFFICE O/P EST SF 10 MIN: CPT | Performed by: PHARMACIST

## 2024-07-24 PROCEDURE — 85610 PROTHROMBIN TIME: CPT | Performed by: PHARMACIST

## 2024-07-24 NOTE — PROGRESS NOTES
Patient seen in clinic for warfarin management due to atrial fibrillation with an INR goal of 2.0-3.0.  Estimated duration of therapy is indefinite.     Patient states compliant all of the time with regimen.  No bleeding or thromboembolic side effects noted.  No significant med changes.  No significant recent illness or disease state changes. She reports having a glass of tea at a friend's house about 2 days ago but couldn't confirm if it had green tea in it. Also reports not taking her multivitamin over the last couple of days.      PT/INR done in office per protocol.  INR is 3.8 which is supratherapeutic.     Warfarin regimen will be held for today then resume usual regimen 1.25mg Sun/Tues/Thurs, 2.5mg all other days.  Will retest in 2 weeks.    Patient understands dosing directions and information discussed. Dosing schedule and follow up appointment given to patient.   Progress note routed to referring physicians office. Discussed with patient the Pharmacist Collaborative Practice Agreement.  Patient provided verbal and/or electronic (ex. [employer]) consent to participate in the collaborative practice agreement between the pharmacist and referred patient.     For Pharmacy Admin Tracking Only    Intervention Detail: Dose Adjustment: 1, reason: Therapy De-escalation  Total # of Interventions Recommended: 1  Total # of Interventions Accepted: 1  Time Spent (min): 15

## 2024-08-06 ENCOUNTER — TELEPHONE (OUTPATIENT)
Age: 79
End: 2024-08-06

## 2024-08-29 ENCOUNTER — ANTI-COAG VISIT (OUTPATIENT)
Age: 79
End: 2024-08-29
Payer: MEDICARE

## 2024-08-29 DIAGNOSIS — I48.91 ATRIAL FIBRILLATION, UNSPECIFIED TYPE (HCC): Primary | ICD-10-CM

## 2024-08-29 LAB
INTERNATIONAL NORMALIZATION RATIO, POC: 3
PROTHROMBIN TIME, POC: 35.7

## 2024-08-29 PROCEDURE — 99211 OFF/OP EST MAY X REQ PHY/QHP: CPT

## 2024-08-29 PROCEDURE — 85610 PROTHROMBIN TIME: CPT

## 2024-08-29 NOTE — PROGRESS NOTES
Patient seen in clinic for warfarin management due to atrial fibrillation with an INR goal of 2.0-3.0.  Estimated duration of therapy is indefinite.     Patient states compliant all of the time with regimen.  No bleeding or thromboembolic side effects noted.  No significant med or dietary changes.  I encouraged her to keep eating the leafy greens to keep her INR in range. No significant recent illness or disease state changes.      PT/INR done in office per protocol.  INR is 3.0 which is therapeutic.     Warfarin regimen will be continued at current dose 1.25 mg Sun/Tues/Thurs and 2.5 mg all other days.  Will retest in 4 weeks.    Patient understands dosing directions and information discussed. Dosing schedule and follow up appointment given to patient.   Progress note routed to referring physicians office. Discussed with patient the Pharmacist Collaborative Practice Agreement.  Patient provided verbal and/or electronic (ex. [employer]) consent to participate in the collaborative practice agreement between the pharmacist and referred patient.     For Pharmacy Admin Tracking Only    Intervention Detail:   Total # of Interventions Recommended: 0  Total # of Interventions Accepted: 0  Time Spent (min): 15

## 2024-09-24 ENCOUNTER — ANTI-COAG VISIT (OUTPATIENT)
Age: 79
End: 2024-09-24
Payer: MEDICARE

## 2024-09-24 LAB
INTERNATIONAL NORMALIZATION RATIO, POC: 4.1
PROTHROMBIN TIME, POC: 49.3

## 2024-09-24 PROCEDURE — 99212 OFFICE O/P EST SF 10 MIN: CPT

## 2024-09-24 PROCEDURE — 85610 PROTHROMBIN TIME: CPT

## 2024-10-05 ENCOUNTER — APPOINTMENT (OUTPATIENT)
Dept: GENERAL RADIOLOGY | Age: 79
End: 2024-10-05
Payer: MEDICARE

## 2024-10-05 ENCOUNTER — HOSPITAL ENCOUNTER (EMERGENCY)
Age: 79
Discharge: HOME | End: 2024-10-05
Attending: EMERGENCY MEDICINE
Payer: MEDICARE

## 2024-10-05 VITALS
BODY MASS INDEX: 33.04 KG/M2 | SYSTOLIC BLOOD PRESSURE: 117 MMHG | TEMPERATURE: 97.4 F | DIASTOLIC BLOOD PRESSURE: 74 MMHG | WEIGHT: 175 LBS | HEART RATE: 99 BPM | OXYGEN SATURATION: 98 % | RESPIRATION RATE: 16 BRPM | HEIGHT: 61 IN

## 2024-10-05 DIAGNOSIS — M79.672 LEFT FOOT PAIN: Primary | ICD-10-CM

## 2024-10-05 PROCEDURE — 73630 X-RAY EXAM OF FOOT: CPT

## 2024-10-05 PROCEDURE — 99283 EMERGENCY DEPT VISIT LOW MDM: CPT

## 2024-10-05 ASSESSMENT — ENCOUNTER SYMPTOMS
COLOR CHANGE: 0
SHORTNESS OF BREATH: 0

## 2024-10-05 ASSESSMENT — PAIN - FUNCTIONAL ASSESSMENT: PAIN_FUNCTIONAL_ASSESSMENT: 0-10

## 2024-10-05 ASSESSMENT — PAIN SCALES - GENERAL: PAINLEVEL_OUTOF10: 7

## 2024-10-05 NOTE — ED PROVIDER NOTES
eMERGENCY dEPARTMENT eNCOUnter   Independent Attestation     Pt Name: Felecia Pyle  MRN: [id number]  Birthdate 1945  Date of evaluation: 10/5/24     Felecia Pyle is a 79 y.o. female with CC: Foot Pain (Since Thursday, painful denies any injury /)      Based on the medical record the care appears appropriate.  I was personally available for consultation in the Emergency Department.    Pako Mercado MD  Attending Emergency Physician          Pako Mercado MD  10/05/24 [id number]

## 2024-10-05 NOTE — ED PROVIDER NOTES
[facility] ED  eMERGENCY dEPARTMENT eNCOUnter      Pt Name: Felecia Pyle  MRN: [id number]  Birthdate 1945  Date of evaluation: 10/5/2024  Provider: JASON Jones CNP    CHIEF COMPLAINT       Chief Complaint   Patient presents with    Foot Pain     Since Thursday, painful denies any injury            HISTORY OF PRESENT ILLNESS  (Location/Symptom, Timing/Onset, Context/Setting, Quality, Duration, Modifying Factors, Severity.)   Felecia Pyle is a 79 y.o. female who presents to the emergency department. C/o left lateral foot pain. Onset was 10/2/24. Denies fever, chills, injury, weakness, N/T. The pain is mainly with ambulation, palpation. Rates her pain 7/10. She is declining medication for discomfort at this time.      Nursing Notes were reviewed.    ALLERGIES     Patient has no known allergies.    CURRENT MEDICATIONS       Previous Medications    COCONUT OIL PO    Take 5 mLs by mouth Daily    METOPROLOL TARTRATE (LOPRESSOR) 50 MG TABLET    Take 1 tablet by mouth 2 times daily    MULTIPLE VITAMINS-MINERALS (PRESERVISION AREDS 2+MULTI VIT) CAPS    Take 2 capsules by mouth daily    MULTIPLE VITAMINS-MINERALS (SENIOR TABS PO)    Take 1 tablet by mouth Daily    MULTIPLE VITAMINS-MINERALS (THERAPEUTIC MULTIVITAMIN-MINERALS) TABLET    Take 1 tablet by mouth daily    OMEGA-3 FATTY ACIDS (FISH OIL) 1000 MG CAPSULE    Take 1 capsule by mouth daily    OMEPRAZOLE (PRILOSEC) 20 MG DELAYED RELEASE CAPSULE    Take 1 capsule by mouth daily    PROPAFENONE (RYTHMOL) 150 MG TABLET    Take 1 tablet by mouth in the morning and 1 tablet at noon and 1 tablet in the evening.    WARFARIN (COUMADIN) 2.5 MG TABLET    Take 0.5-1 tablets by mouth daily as directed by [facility]       PAST MEDICAL HISTORY         Diagnosis Date    A-fib (HCC)     h/o afib  Dr. Alonso    Arthritis     Breast cancer (HCC) 2007    RT    Breast cancer (HCC) 2019    LT    Cancer (HCC)     breast,skin    GERD  shoe was applied. The application was checked and was appropriate; the LLE remained NVI. Follow up with pcp and/or podiatry for a recheck, further evaluation and treatment. She prefers Tylenol for pain which she has at home. Evaluation and treatment course in the ED, and plan of care upon discharge was discussed in length with the patient. Patient had no further questions prior to being discharged and was instructed to return to the ED for new or worsening symptoms.          FINAL IMPRESSION      1. Left foot pain            Problem List  Patient Active Problem List   Diagnosis Code    Afib (HCC) I48.91    Breast cancer in situ D05.90         DISPOSITION/PLAN   DISPOSITION Decision To Discharge 10/05/2024 12:48:06 PM  Condition at Disposition: Data Unavailable      PATIENT REFERRED TO:   Kem Blackburn MD  [address]  [place] [id number]  [phone]          [facility] ED  [address] [id number] [phone]    If symptoms worsen, As needed    Brady Montanez, DPM  [address] [place] [id number] [phone]    Schedule an appointment as soon as possible for a visit         DISCHARGE MEDICATIONS:     New Prescriptions    No medications on file           (Please note that portions of this note were completed with a voice recognition program.  Efforts were made to edit the dictations but occasionally words are mis-transcribed.)    JASON Jones CNP, Kristin M, APRN - CNP  10/05/24 0630

## 2024-10-08 ENCOUNTER — ANTI-COAG VISIT (OUTPATIENT)
Age: 79
End: 2024-10-08
Payer: MEDICARE

## 2024-10-08 DIAGNOSIS — I48.91 ATRIAL FIBRILLATION, UNSPECIFIED TYPE (HCC): Primary | ICD-10-CM

## 2024-10-08 LAB
INTERNATIONAL NORMALIZATION RATIO, POC: 2.3
PROTHROMBIN TIME, POC: 27.4

## 2024-10-08 PROCEDURE — 99211 OFF/OP EST MAY X REQ PHY/QHP: CPT | Performed by: PHARMACIST

## 2024-10-08 PROCEDURE — 85610 PROTHROMBIN TIME: CPT | Performed by: PHARMACIST

## 2024-10-08 NOTE — PROGRESS NOTES
Patient seen in clinic for warfarin management due to atrial fibrillation with an INR goal of 2.0-3.0.  Estimated duration of therapy is indefinite.     Patient states compliant all of the time with regimen.  No bleeding or thromboembolic side effects noted.  No significant med or dietary changes.  No significant recent illness or disease state changes.  Patient state taking Tylenol for recent foot pain.    PT/INR done in office per protocol.  INR is 2.3 which is therapeutic.     Warfarin regimen will be continued at current dose 2.5mg every Mon, Wed, Fri; 1.25mg all the other days..  Will retest in 4 weeks.    Patient understands dosing directions and information discussed. Dosing schedule and follow up appointment given to patient.   Progress note routed to referring physicians office. Discussed with patient the Pharmacist Collaborative Practice Agreement.  Patient provided verbal and/or electronic (ex. [employer]hart) consent to participate in the collaborative practice agreement between the pharmacist and referred patient.     For Pharmacy Admin Tracking Only    Intervention Detail:   Total # of Interventions Recommended: 1  Total # of Interventions Accepted: 1  Time Spent (min): 15

## 2024-11-04 ENCOUNTER — ANTI-COAG VISIT (OUTPATIENT)
Age: 79
End: 2024-11-04
Payer: MEDICARE

## 2024-11-04 DIAGNOSIS — I48.91 ATRIAL FIBRILLATION, UNSPECIFIED TYPE (HCC): Primary | ICD-10-CM

## 2024-11-04 LAB
INTERNATIONAL NORMALIZATION RATIO, POC: 2.1
PROTHROMBIN TIME, POC: 25.5

## 2024-11-04 PROCEDURE — 85610 PROTHROMBIN TIME: CPT | Performed by: PHARMACIST

## 2024-11-04 PROCEDURE — 99211 OFF/OP EST MAY X REQ PHY/QHP: CPT | Performed by: PHARMACIST

## 2024-11-04 NOTE — PROGRESS NOTES
Patient seen in clinic for warfarin management due to atrial fibrillation with an INR goal of 2.0-3.0.  Estimated duration of therapy is indefinite.     Patient states compliant all of the time with regimen.  No bleeding or thromboembolic side effects noted.  No significant med or dietary changes.  No significant recent illness or disease state changes.      PT/INR done in office per protocol.  INR is 2.1 which is therapeutic.     Warfarin regimen will be continued at current dose of 2.5mg Mon/Wed/Fri and 1.25mg all other days.  Will retest in 4 weeks.    Patient understands dosing directions and information discussed. Dosing schedule and follow up appointment given to patient.   Progress note routed to referring physicians office. Discussed with patient the Pharmacist Collaborative Practice Agreement.  Patient provided verbal and/or electronic (ex. [employer]) consent to participate in the collaborative practice agreement between the pharmacist and referred patient.     For Pharmacy Admin Tracking Only    Intervention Detail:   Total # of Interventions Recommended: 0  Total # of Interventions Accepted: 0  Time Spent (min): 15

## 2024-12-02 ENCOUNTER — ANTI-COAG VISIT (OUTPATIENT)
Age: 79
End: 2024-12-02
Payer: MEDICARE

## 2024-12-02 DIAGNOSIS — I48.91 ATRIAL FIBRILLATION, UNSPECIFIED TYPE (HCC): Primary | ICD-10-CM

## 2024-12-02 LAB
INTERNATIONAL NORMALIZATION RATIO, POC: 3.2
PROTHROMBIN TIME, POC: 38.7

## 2024-12-02 PROCEDURE — 85610 PROTHROMBIN TIME: CPT | Performed by: PHARMACIST

## 2024-12-02 PROCEDURE — 99211 OFF/OP EST MAY X REQ PHY/QHP: CPT | Performed by: PHARMACIST

## 2024-12-02 NOTE — PROGRESS NOTES
Patient seen in clinic for warfarin management due to atrial fibrillation with an INR goal of 2.0-3.0.  Estimated duration of therapy is indefinite.     Patient states  she had only taken the 1.25mg one time on a Saturday then transitioned back to previous regimen of 2.5mg that day .  No bleeding or thromboembolic side effects noted.  No significant med or dietary changes.  No significant recent illness or disease state changes.      PT/INR done in office per protocol.  INR is 3.2 which is just above goal.     Warfarin regimen will be continued with intended target of 12.5mg; 2.5mg Mon/Wed/Fri only and 1.25mg all other days.  Will retest in 4 weeks.    Patient understands dosing directions and information discussed. Dosing schedule and follow up appointment given to patient.   Progress note routed to referring physicians office. Discussed with patient the Pharmacist Collaborative Practice Agreement.  Patient provided verbal and/or electronic (ex. [employer]) consent to participate in the collaborative practice agreement between the pharmacist and referred patient.     For Pharmacy Admin Tracking Only    Intervention Detail: Adherence Monitorin  Total # of Interventions Recommended: 1  Total # of Interventions Accepted: 1  Time Spent (min): 15

## 2024-12-31 ENCOUNTER — ANTI-COAG VISIT (OUTPATIENT)
Age: 79
End: 2024-12-31
Payer: MEDICARE

## 2024-12-31 DIAGNOSIS — I48.91 ATRIAL FIBRILLATION, UNSPECIFIED TYPE (HCC): Primary | ICD-10-CM

## 2024-12-31 LAB
INTERNATIONAL NORMALIZATION RATIO, POC: 3.6
PROTHROMBIN TIME, POC: 43.3

## 2024-12-31 PROCEDURE — 99212 OFFICE O/P EST SF 10 MIN: CPT

## 2024-12-31 PROCEDURE — 85610 PROTHROMBIN TIME: CPT

## 2024-12-31 NOTE — PROGRESS NOTES
Patient seen in clinic for warfarin management due to atrial fibrillation with an INR goal of 2.0-3.0.  Estimated duration of therapy is indefinite.     Patient states compliant all of the time with regimen.  No bleeding or thromboembolic side effects noted.  No significant med or dietary changes.  No  disease state changes.  She states she was a little sick before walter with decreased appetite but feels back to normal now.    Patient states she hit her arm on a closet latch which took the skin right off. It appears to be healing okay.    PT/INR done in office per protocol.  INR is 3.6 which is supratherapeutic for the second reading (last 3.2 on 12/2/24).     Warfarin regimen will be held 12/31 followed by a decreased maintenance regimen of 2.5 mg Mon/Fri and 1.25 mg all other days.  Will retest in 2 weeks.    Patient understands dosing directions and information discussed. Dosing schedule and follow up appointment given to patient.   Progress note routed to referring physicians office. Discussed with patient the Pharmacist Collaborative Practice Agreement.  Patient provided verbal and/or electronic (ex. [employer]) consent to participate in the collaborative practice agreement between the pharmacist and referred patient.     For Pharmacy Admin Tracking Only    Intervention Detail: Dose Adjustment: 1, reason: Therapy De-escalation  Total # of Interventions Recommended: 1  Total # of Interventions Accepted: 1  Time Spent (min): 15

## 2025-01-14 ENCOUNTER — ANTI-COAG VISIT (OUTPATIENT)
Age: 80
End: 2025-01-14
Payer: MEDICARE

## 2025-01-14 DIAGNOSIS — I48.91 ATRIAL FIBRILLATION, UNSPECIFIED TYPE (HCC): Primary | ICD-10-CM

## 2025-01-14 LAB
INTERNATIONAL NORMALIZATION RATIO, POC: 1.8
PROTHROMBIN TIME, POC: 21.5

## 2025-01-14 PROCEDURE — 85610 PROTHROMBIN TIME: CPT | Performed by: PHARMACIST

## 2025-01-14 PROCEDURE — 99211 OFF/OP EST MAY X REQ PHY/QHP: CPT | Performed by: PHARMACIST

## 2025-01-14 RX ORDER — WARFARIN SODIUM 2.5 MG/1
1.25-2.5 TABLET ORAL DAILY
Qty: 90 TABLET | Refills: 1 | Status: SHIPPED | OUTPATIENT
Start: 2025-01-14

## 2025-01-14 NOTE — PROGRESS NOTES
Patient seen in clinic for warfarin management due to atrial fibrillation with an INR goal of 2.0-3.0.  Estimated duration of therapy is indefinite.     Patient states compliant all of the time with regimen.  No bleeding or thromboembolic side effects noted.  No significant med changes.  No significant recent illness or disease state changes. She reports eating vegetable soup that contained dark green veggies for about 3 days last week.     PT/INR done in office per protocol.  INR is 1.8 which is just below goal likely due to recent increase in dietary vitamin K.     Warfarin regimen will be continued at current dose 2.5mg Mon/Fri, 1.25mg all other days. Refill for 2.5mg tabs issued to [facility] per her request.  Will retest in 2 weeks.    Patient understands dosing directions and information discussed. Dosing schedule and follow up appointment given to patient.   Progress note routed to referring physicians office. Discussed with patient the Pharmacist Collaborative Practice Agreement.  Patient provided verbal and/or electronic (ex. [employer]) consent to participate in the collaborative practice agreement between the pharmacist and referred patient.     For Pharmacy Admin Tracking Only    Intervention Detail: Refill(s) Provided  Total # of Interventions Recommended: 1  Total # of Interventions Accepted: 1  Time Spent (min): 15

## 2025-01-28 ENCOUNTER — ANTI-COAG VISIT (OUTPATIENT)
Age: 80
End: 2025-01-28
Payer: MEDICARE

## 2025-01-28 DIAGNOSIS — I48.91 ATRIAL FIBRILLATION, UNSPECIFIED TYPE (HCC): Primary | ICD-10-CM

## 2025-01-28 LAB
INTERNATIONAL NORMALIZATION RATIO, POC: 1.8
PROTHROMBIN TIME, POC: 22.2

## 2025-01-28 PROCEDURE — 99211 OFF/OP EST MAY X REQ PHY/QHP: CPT | Performed by: PHARMACIST

## 2025-01-28 PROCEDURE — 85610 PROTHROMBIN TIME: CPT | Performed by: PHARMACIST

## 2025-01-28 NOTE — PROGRESS NOTES
Patient seen in clinic for warfarin management due to atrial fibrillation with an INR goal of 2.0-3.0.  Estimated duration of therapy is indefinite.     Patient states compliant all of the time with regimen.  No bleeding or thromboembolic side effects noted.  No significant med or dietary changes.  No significant recent illness or disease state changes.      PT/INR done in office per protocol.  INR is 1.8 which is just below goal.     Warfarin regimen will be increased to 2.5mg Mon/Wed/Fri, 1.25mg all other days as her recent INRs have been trending just below goal.  Will retest in 2 weeks.    Patient understands dosing directions and information discussed. Dosing schedule and follow up appointment given to patient.   Progress note routed to referring physicians office. Discussed with patient the Pharmacist Collaborative Practice Agreement.  Patient provided verbal and/or electronic (ex. [employer]) consent to participate in the collaborative practice agreement between the pharmacist and referred patient.     For Pharmacy Admin Tracking Only    Intervention Detail: Dose Adjustment: 1, reason: Therapy Optimization  Total # of Interventions Recommended: 1  Total # of Interventions Accepted: 1  Time Spent (min): 15

## 2025-02-11 ENCOUNTER — ANTI-COAG VISIT (OUTPATIENT)
Age: 80
End: 2025-02-11
Payer: MEDICARE

## 2025-02-11 DIAGNOSIS — I48.91 ATRIAL FIBRILLATION, UNSPECIFIED TYPE (HCC): Primary | ICD-10-CM

## 2025-02-11 LAB
INR BLD: 1.8
PROTIME: 21.8

## 2025-02-11 PROCEDURE — 99212 OFFICE O/P EST SF 10 MIN: CPT

## 2025-02-11 PROCEDURE — 85610 PROTHROMBIN TIME: CPT

## 2025-02-11 NOTE — PROGRESS NOTES
Patient seen in clinic for warfarin management due to atrial fibrillation with an INR goal of 2.0-3.0.  Estimated duration of therapy is indefinite.     Patient states compliant all of the time with regimen.  No bleeding or thromboembolic side effects noted.  No significant med or dietary changes. She states she has had recent cold symptoms but hasn't taken anything for it. No other significant recent illness or disease state changes.      PT/INR done in office per protocol.  INR is 1.8 which is subtherapeutic.     Warfarin regimen will be increased to 1.25mg on Sun/Tues/Thurs and 2.5mg the rest of the days.  Will retest in 2 weeks.    Patient understands dosing directions and information discussed. Dosing schedule and follow up appointment given to patient.   Progress note routed to referring physicians office. Discussed with patient the Pharmacist Collaborative Practice Agreement.  Patient provided verbal and/or electronic (ex. [employer]) consent to participate in the collaborative practice agreement between the pharmacist and referred patient.     For Pharmacy Admin Tracking Only    Intervention Detail: Dose Adjustment: 1, reason: Therapy Optimization  Total # of Interventions Recommended: 1  Total # of Interventions Accepted: 1  Time Spent (min): 15

## 2025-02-12 PROCEDURE — 85610 PROTHROMBIN TIME: CPT | Performed by: PHARMACIST

## 2025-02-12 PROCEDURE — 99212 OFFICE O/P EST SF 10 MIN: CPT | Performed by: PHARMACIST

## 2025-02-25 ENCOUNTER — ANTI-COAG VISIT (OUTPATIENT)
Age: 80
End: 2025-02-25
Payer: MEDICARE

## 2025-02-25 DIAGNOSIS — I48.91 ATRIAL FIBRILLATION, UNSPECIFIED TYPE (HCC): Primary | ICD-10-CM

## 2025-02-25 LAB
INR BLD: 2.8
PROTIME: 33

## 2025-02-25 PROCEDURE — 99212 OFFICE O/P EST SF 10 MIN: CPT

## 2025-02-25 PROCEDURE — 85610 PROTHROMBIN TIME: CPT

## 2025-02-25 RX ORDER — WARFARIN SODIUM 2.5 MG/1
1.25-2.5 TABLET ORAL DAILY
Qty: 90 TABLET | Refills: 1 | Status: SHIPPED | OUTPATIENT
Start: 2025-02-25

## 2025-02-25 NOTE — PROGRESS NOTES
Patient seen in clinic for warfarin management due to atrial fibrillation with an INR goal of 2.0-3.0.  Estimated duration of therapy is indefinite.     Patient states compliant all of the time with regimen.  No bleeding or thromboembolic side effects noted.  No significant med or dietary changes.  No significant recent illness or disease state changes.      PT/INR done in office per protocol.  INR is 2.8 which is therapeutic.     Warfarin regimen will be continued at current dose 1.25mg on Sun/Tues/Thurs and 2.5mg daily the rest of the week.  Will retest in 3 weeks.  [facility]    Patient understands dosing directions and information discussed. Dosing schedule and follow up appointment given to patient.   Progress note routed to referring physicians office. Discussed with patient the Pharmacist Collaborative Practice Agreement.  Patient provided verbal and/or electronic (ex. [employer]) consent to participate in the collaborative practice agreement between the pharmacist and referred patient.     For Pharmacy Admin Tracking Only    Intervention Detail: Refill(s) Provided  Total # of Interventions Recommended: 1  Total # of Interventions Accepted: 1  Time Spent (min): 15

## 2025-03-18 ENCOUNTER — ANTI-COAG VISIT (OUTPATIENT)
Age: 80
End: 2025-03-18
Payer: MEDICARE

## 2025-03-18 DIAGNOSIS — I48.91 ATRIAL FIBRILLATION, UNSPECIFIED TYPE (HCC): Primary | ICD-10-CM

## 2025-03-18 LAB
INTERNATIONAL NORMALIZATION RATIO, POC: 2.1
PROTHROMBIN TIME, POC: 25.7

## 2025-03-18 PROCEDURE — 99211 OFF/OP EST MAY X REQ PHY/QHP: CPT | Performed by: PHARMACIST

## 2025-03-18 PROCEDURE — 85610 PROTHROMBIN TIME: CPT | Performed by: PHARMACIST

## 2025-03-18 NOTE — PROGRESS NOTES
Patient seen in clinic for warfarin management due to atrial fibrillation with an INR goal of 2.0-3.0.  Estimated duration of therapy is indefinite.     Patient states compliant all of the time with regimen.  No bleeding or thromboembolic side effects noted.  No significant med or dietary changes.  No significant recent illness or disease state changes.      PT/INR done in office per protocol.  INR is 2.1 which is therapeutic.     Warfarin regimen will be continued at current dose 1.25mg Sun/Tues/Thurs, 2.5mg all other days.  Will retest in 4 weeks.    Patient understands dosing directions and information discussed. Dosing schedule and follow up appointment given to patient.   Progress note routed to referring physicians office. Discussed with patient the Pharmacist Collaborative Practice Agreement.  Patient provided verbal and/or electronic (ex. [employer]) consent to participate in the collaborative practice agreement between the pharmacist and referred patient.     For Pharmacy Admin Tracking Only    Intervention Detail:   Total # of Interventions Recommended: 0  Total # of Interventions Accepted: 0  Time Spent (min): 15

## 2025-04-14 ENCOUNTER — TELEPHONE (OUTPATIENT)
Age: 80
End: 2025-04-14

## 2025-04-14 NOTE — TELEPHONE ENCOUNTER
Patient left a voicemail on Saturday asking to reschedule her appointment on Tuesday because she has a . Left message on her voicemail to call us back.

## 2025-04-15 ENCOUNTER — TRANSCRIBE ORDERS (OUTPATIENT)
Dept: ADMINISTRATIVE | Age: 80
End: 2025-04-15

## 2025-04-15 DIAGNOSIS — Z12.31 ENCOUNTER FOR SCREENING MAMMOGRAM FOR MALIGNANT NEOPLASM OF BREAST: Primary | ICD-10-CM

## 2025-04-16 ENCOUNTER — ANTI-COAG VISIT (OUTPATIENT)
Age: 80
End: 2025-04-16
Payer: MEDICARE

## 2025-04-16 DIAGNOSIS — I48.91 ATRIAL FIBRILLATION, UNSPECIFIED TYPE (HCC): Primary | ICD-10-CM

## 2025-04-16 LAB
INTERNATIONAL NORMALIZATION RATIO, POC: 3.2
PROTHROMBIN TIME, POC: 38.6

## 2025-04-16 PROCEDURE — 85610 PROTHROMBIN TIME: CPT | Performed by: PHARMACIST

## 2025-04-16 PROCEDURE — 99211 OFF/OP EST MAY X REQ PHY/QHP: CPT | Performed by: PHARMACIST

## 2025-04-16 NOTE — PROGRESS NOTES
Patient seen in clinic for warfarin management due to atrial fibrillation with an INR goal of 2.0-3.0.  Estimated duration of therapy is indefinite.     Patient states compliant all of the time with regimen.  No bleeding or thromboembolic side effects noted.  No significant med or dietary changes.  She is currently experiencing some cough/nasal symptoms but is not sure if they are cold or allergen related.    PT/INR done in office per protocol.  INR is 3.2 which is just above goal.     Warfarin regimen will be continued at current dose 1.25mg Sun/Tues/Thurs, 2.5mg all other days.  Will retest in 2 weeks.    Patient understands dosing directions and information discussed. Dosing schedule and follow up appointment given to patient.   Progress note routed to referring physicians office. Discussed with patient the Pharmacist Collaborative Practice Agreement.  Patient provided verbal and/or electronic (ex. [employer]) consent to participate in the collaborative practice agreement between the pharmacist and referred patient.     For Pharmacy Admin Tracking Only    Intervention Detail:   Total # of Interventions Recommended: 0  Total # of Interventions Accepted: 0  Time Spent (min): 15

## 2025-05-02 ENCOUNTER — ANTI-COAG VISIT (OUTPATIENT)
Age: 80
End: 2025-05-02
Payer: MEDICARE

## 2025-05-02 DIAGNOSIS — I48.91 ATRIAL FIBRILLATION, UNSPECIFIED TYPE (HCC): Primary | ICD-10-CM

## 2025-05-02 LAB
INTERNATIONAL NORMALIZATION RATIO, POC: 3.4
PROTHROMBIN TIME, POC: 41.4

## 2025-05-02 PROCEDURE — 85610 PROTHROMBIN TIME: CPT | Performed by: PHARMACIST

## 2025-05-02 PROCEDURE — 99212 OFFICE O/P EST SF 10 MIN: CPT | Performed by: PHARMACIST

## 2025-05-02 NOTE — PROGRESS NOTES
Patient seen in clinic for warfarin management due to atrial fibrillation with an INR goal of 2.0-3.0.  Estimated duration of therapy is indefinite.     Patient states compliant all of the time with regimen.  No bleeding or thromboembolic side effects noted.  No significant med or dietary changes.  No significant recent illness or disease state changes.      PT/INR done in office per protocol.  INR is 3.4 which is supratherapeutic.     Warfarin regimen will be 1.25mg today, then decreased to 2.5mg Mon/Wed/Fri, 1.25mg all other days.  Will retest in 2 weeks to assess.    Patient understands dosing directions and information discussed. Dosing schedule and follow up appointment given to patient.   Progress note routed to referring physicians office. Discussed with patient the Pharmacist Collaborative Practice Agreement.  Patient provided verbal and/or electronic (ex. [employer]) consent to participate in the collaborative practice agreement between the pharmacist and referred patient.     For Pharmacy Admin Tracking Only    Intervention Detail: Dose Adjustment: 1, reason: Therapy De-escalation  Total # of Interventions Recommended: 1  Total # of Interventions Accepted: 1  Time Spent (min): 15

## 2025-05-16 ENCOUNTER — TELEPHONE (OUTPATIENT)
Age: 80
End: 2025-05-16

## 2025-05-16 NOTE — TELEPHONE ENCOUNTER
Patient was a no call/no show for INR appointment today.  Called and left voice mail to reschedule.

## 2025-05-28 ENCOUNTER — ANTI-COAG VISIT (OUTPATIENT)
Age: 80
End: 2025-05-28
Payer: MEDICARE

## 2025-05-28 DIAGNOSIS — I48.91 ATRIAL FIBRILLATION, UNSPECIFIED TYPE (HCC): Primary | ICD-10-CM

## 2025-05-28 LAB
INTERNATIONAL NORMALIZATION RATIO, POC: 2
PROTHROMBIN TIME, POC: 24.4

## 2025-05-28 PROCEDURE — 99212 OFFICE O/P EST SF 10 MIN: CPT

## 2025-05-28 PROCEDURE — 85610 PROTHROMBIN TIME: CPT

## 2025-05-28 RX ORDER — WARFARIN SODIUM 2.5 MG/1
1.25-2.5 TABLET ORAL DAILY
Qty: 90 TABLET | Refills: 1 | Status: SHIPPED | OUTPATIENT
Start: 2025-05-28

## 2025-05-28 NOTE — PROGRESS NOTES
Patient seen in clinic for warfarin management due to atrial fibrillation with an INR goal of 2.0-3.0.  Estimated duration of therapy is indefinite.     Patient states compliant all of the time with regimen.  No bleeding or thromboembolic side effects noted.  No significant med changes.  No significant recent illness or disease state changes.  Patient states she missed last appointment due to traveling to [place] for a . States her diet has been different due to travel and holiday.     PT/INR done in office per protocol.  INR is 2.0 which is therapeutic.     Warfarin regimen will be continued at current dose of 2.5mg Mon/Weds/Fri and 1.25mg all other days.  Will retest in 2 weeks. Refill sent to [facility] at patient request.     Patient understands dosing directions and information discussed. Dosing schedule and follow up appointment given to patient.   Progress note routed to referring physicians office. Discussed with patient the Pharmacist Collaborative Practice Agreement.  Patient provided verbal and/or electronic (ex. mychart) consent to participate in the collaborative practice agreement between the pharmacist and referred patient.     For Pharmacy Admin Tracking Only    Intervention Detail: Refill(s) Provided  Total # of Interventions Recommended: 1  Total # of Interventions Accepted: 1  Time Spent (min): 15

## 2025-06-11 ENCOUNTER — ANTI-COAG VISIT (OUTPATIENT)
Age: 80
End: 2025-06-11
Payer: MEDICARE

## 2025-06-11 DIAGNOSIS — I48.91 ATRIAL FIBRILLATION, UNSPECIFIED TYPE (HCC): Primary | ICD-10-CM

## 2025-06-11 LAB
INTERNATIONAL NORMALIZATION RATIO, POC: 2
PROTHROMBIN TIME, POC: 0

## 2025-06-11 PROCEDURE — 85610 PROTHROMBIN TIME: CPT

## 2025-06-11 PROCEDURE — 99212 OFFICE O/P EST SF 10 MIN: CPT

## 2025-06-11 RX ORDER — WARFARIN SODIUM 2.5 MG/1
1.25-2.5 TABLET ORAL DAILY
Qty: 90 TABLET | Refills: 1 | Status: SHIPPED | OUTPATIENT
Start: 2025-06-11

## 2025-06-11 NOTE — PROGRESS NOTES
Patient seen in clinic for warfarin management due to atrial fibrillation with an INR goal of 2.0-3.0.  Estimated duration of therapy is indefinite.     Patient states  she may have taking a full tablet on Saturday instead of a half, but has otherwise been compliant with regimen .  No bleeding or thromboembolic side effects noted.  No significant med or dietary changes.  No significant recent illness or disease state changes.      PT/INR done in office per protocol.  INR is 2.0 which is therapeutic.     Warfarin regimen will be continued at current dose of 2.5mg Mon/Weds/Fri and 1.25mg all other days.  Will retest in 4 weeks. Refill sent to [facility] at patient request.     Patient understands dosing directions and information discussed. Dosing schedule and follow up appointment given to patient.   Progress note routed to referring physicians office. Discussed with patient the Pharmacist Collaborative Practice Agreement.  Patient provided verbal and/or electronic (ex. [employer]) consent to participate in the collaborative practice agreement between the pharmacist and referred patient.     For Pharmacy Admin Tracking Only    Intervention Detail: Refill(s) Provided  Total # of Interventions Recommended: 1  Total # of Interventions Accepted: 1  Time Spent (min): 15

## 2025-06-26 ENCOUNTER — HOSPITAL ENCOUNTER (OUTPATIENT)
Dept: MAMMOGRAPHY | Age: 80
Discharge: HOME | End: 2025-06-28
Attending: INTERNAL MEDICINE
Payer: MEDICARE

## 2025-06-26 VITALS — BODY MASS INDEX: 33.23 KG/M2 | WEIGHT: 176 LBS | HEIGHT: 61 IN

## 2025-06-26 DIAGNOSIS — Z12.31 ENCOUNTER FOR SCREENING MAMMOGRAM FOR MALIGNANT NEOPLASM OF BREAST: ICD-10-CM

## 2025-06-26 PROCEDURE — 77063 BREAST TOMOSYNTHESIS BI: CPT

## 2025-07-09 ENCOUNTER — ANTI-COAG VISIT (OUTPATIENT)
Age: 80
End: 2025-07-09
Payer: MEDICARE

## 2025-07-09 DIAGNOSIS — I48.91 ATRIAL FIBRILLATION, UNSPECIFIED TYPE (HCC): Primary | ICD-10-CM

## 2025-07-09 LAB
INTERNATIONAL NORMALIZATION RATIO, POC: 2.8
PROTHROMBIN TIME, POC: 0

## 2025-07-09 PROCEDURE — 99211 OFF/OP EST MAY X REQ PHY/QHP: CPT

## 2025-07-09 PROCEDURE — 85610 PROTHROMBIN TIME: CPT

## 2025-07-09 NOTE — PROGRESS NOTES
Patient seen in clinic for warfarin management due to atrial fibrillation with an INR goal of 2.0-3.0.  Estimated duration of therapy is indefinite.     Patient states she knows she took a full tab this Saturday, instead of a half tab. States she forgets that her dosing schedule changed in May and is not sure how often she takes the full tab on Saturdays versus the half tab she is supposed to take.  No bleeding or thromboembolic side effects noted.  No significant med or dietary changes.  No significant recent illness or disease state changes.      PT/INR done in office per protocol.  INR is 2.8 which is therapeutic.     Warfarin regimen will be continued at current dose of 2.5mg every Mon/Weds/Fri and 1.25mg all other days.  Will retest in 2 weeks to follow INR more closely due to accidental extra doses.    Patient understands dosing directions and information discussed. Dosing schedule and follow up appointment given to patient.   Progress note routed to referring physicians office. Discussed with patient the Pharmacist Collaborative Practice Agreement.  Patient provided verbal and/or electronic (ex. [employer]) consent to participate in the collaborative practice agreement between the pharmacist and referred patient.     For Pharmacy Admin Tracking Only    Intervention Detail:   Total # of Interventions Recommended: 0  Total # of Interventions Accepted: 0  Time Spent (min): 15

## 2025-07-23 ENCOUNTER — ANTI-COAG VISIT (OUTPATIENT)
Age: 80
End: 2025-07-23
Payer: MEDICARE

## 2025-07-23 DIAGNOSIS — I48.91 ATRIAL FIBRILLATION, UNSPECIFIED TYPE (HCC): Primary | ICD-10-CM

## 2025-07-23 LAB
INTERNATIONAL NORMALIZATION RATIO, POC: 2.2
PROTHROMBIN TIME, POC: 0

## 2025-07-23 PROCEDURE — 99212 OFFICE O/P EST SF 10 MIN: CPT

## 2025-07-23 PROCEDURE — 85610 PROTHROMBIN TIME: CPT

## 2025-07-23 RX ORDER — WARFARIN SODIUM 2.5 MG/1
1.25-2.5 TABLET ORAL DAILY
Qty: 90 TABLET | Refills: 1 | Status: SHIPPED | OUTPATIENT
Start: 2025-07-23

## 2025-07-23 NOTE — PROGRESS NOTES
Patient seen in clinic for warfarin management due to atrial fibrillation with an INR goal of 2.0-3.0.  Estimated duration of therapy is indefinite.     Patient states she took a full tablet on Saturday instead of her prescribed half, but has otherwise been compliant. She has been having problems remembering to take only a half tab on Saturdays for multiple weeks.  No bleeding or thromboembolic side effects noted.  No significant med or dietary changes.  No significant recent illness or disease state changes.      PT/INR done in office per protocol.  INR is 2.2 which is therapeutic.     Warfarin regimen will be increased back to previous dosing of 1.25mg Sun/Tues/Thurs and 2.5mg all other days on a trial basis. This will be to try to match dose to what she has been taking at home most Saturdays.   Will retest in 2 weeks.    Patient understands dosing directions and information discussed. Dosing schedule and follow up appointment given to patient.   Progress note routed to referring physicians office. Discussed with patient the Pharmacist Collaborative Practice Agreement.  Patient provided verbal and/or electronic (ex. [employer]) consent to participate in the collaborative practice agreement between the pharmacist and referred patient.     For Pharmacy Admin Tracking Only    Intervention Detail: Dose Adjustment: 1, reason: Therapy Optimization  Total # of Interventions Recommended: 1  Total # of Interventions Accepted: 1  Time Spent (min): 15

## 2025-08-06 ENCOUNTER — ANTI-COAG VISIT (OUTPATIENT)
Age: 80
End: 2025-08-06
Payer: MEDICARE

## 2025-08-06 DIAGNOSIS — I48.91 ATRIAL FIBRILLATION, UNSPECIFIED TYPE (HCC): Primary | ICD-10-CM

## 2025-08-06 LAB
INTERNATIONAL NORMALIZATION RATIO, POC: 3.1
PROTHROMBIN TIME, POC: 0

## 2025-08-06 PROCEDURE — 85610 PROTHROMBIN TIME: CPT

## 2025-08-06 PROCEDURE — 99211 OFF/OP EST MAY X REQ PHY/QHP: CPT

## 2025-08-20 ENCOUNTER — ANTI-COAG VISIT (OUTPATIENT)
Age: 80
End: 2025-08-20
Payer: MEDICARE

## 2025-08-20 DIAGNOSIS — I48.91 ATRIAL FIBRILLATION, UNSPECIFIED TYPE (HCC): Primary | ICD-10-CM

## 2025-08-20 LAB
INTERNATIONAL NORMALIZATION RATIO, POC: 2.4
PROTHROMBIN TIME, POC: NORMAL

## 2025-08-20 PROCEDURE — 85610 PROTHROMBIN TIME: CPT

## 2025-08-20 PROCEDURE — 99211 OFF/OP EST MAY X REQ PHY/QHP: CPT

## 2025-09-03 ENCOUNTER — ANTI-COAG VISIT (OUTPATIENT)
Age: 80
End: 2025-09-03
Payer: MEDICARE

## 2025-09-03 DIAGNOSIS — I48.91 ATRIAL FIBRILLATION, UNSPECIFIED TYPE (HCC): Primary | ICD-10-CM

## 2025-09-03 LAB
INTERNATIONAL NORMALIZATION RATIO, POC: 3.6
PROTHROMBIN TIME, POC: NORMAL

## 2025-09-03 PROCEDURE — 99212 OFFICE O/P EST SF 10 MIN: CPT

## 2025-09-03 PROCEDURE — 85610 PROTHROMBIN TIME: CPT

## (undated) DEVICE — HOOK LOCK LATEX FREE ELASTIC BANDAGE 6INX5YD

## (undated) DEVICE — SURGICAL PROCEDURE KIT BASIN MAJ SET UP

## (undated) DEVICE — SUTURE VCRL SZ 0 L54IN ABSRB UD LIGAPAK REEL NDL J287G

## (undated) DEVICE — GLOVE SURG SZ 75 L12IN FNGR THK87MIL WHT LTX FREE

## (undated) DEVICE — DRAPE,REIN 53X77,STERILE: Brand: MEDLINE

## (undated) DEVICE — SPONGE LAP W18XL18IN WHT COT 4 PLY FLD STRUNG RADPQ DISP ST

## (undated) DEVICE — SHEARS ENDOSCP L9CM CRV HARM FOCS +

## (undated) DEVICE — SUTURE MCRYL SZ 3-0 L27IN ABSRB UD L26MM SH 1/2 CIR Y416H

## (undated) DEVICE — DRESSING TRNSPAR W4XL4.8IN W/ N ADH PD SURESITE-123 PLUSPD

## (undated) DEVICE — GOWN,SIRUS,NON REINFRCD,LARGE,SET IN SL: Brand: MEDLINE

## (undated) DEVICE — STANDARD HYPODERMIC NEEDLE,POLYPROPYLENE HUB: Brand: MONOJECT

## (undated) DEVICE — INTENDED FOR TISSUE SEPARATION, AND OTHER PROCEDURES THAT REQUIRE A SHARP SURGICAL BLADE TO PUNCTURE OR CUT.: Brand: BARD-PARKER ® CARBON RIB-BACK BLADES

## (undated) DEVICE — CONTAINER,SPECIMEN,OR STERILE,4OZ: Brand: MEDLINE

## (undated) DEVICE — SUTURE ETHLN SZ 3-0 L18IN NONABSORBABLE BLK PS-2 L19MM 3/8 1669H

## (undated) DEVICE — PAD,ABDOMINAL,5"X9",ST,LF,25/BX: Brand: MEDLINE INDUSTRIES, INC.

## (undated) DEVICE — 3M™ WARMING BLANKET, LOWER BODY, 10 PER CASE, 42568: Brand: BAIR HUGGER™

## (undated) DEVICE — Z INACTIVE USE 2653177 SPONGE GZ W2XL2IN NONWOVEN 4 PLY FASTER WICKING ABIL AVANT

## (undated) DEVICE — BANDAGE COMPR W6INXL5YD WHT BGE POLY COT M E WRP WV HK AND

## (undated) DEVICE — TOWEL,OR,DSP,ST,BLUE,STD,4/PK,20PK/CS: Brand: MEDLINE

## (undated) DEVICE — PAD,NON-ADHERENT,3X8,STERILE,LF,1/PK: Brand: MEDLINE

## (undated) DEVICE — DRESSING TRNSPAR W2XL2.75IN FLM SHT SEMIPERMEABLE WIND

## (undated) DEVICE — GLOVE SURG SZ 6 L12IN FNGR THK87MIL WHT LTX FREE

## (undated) DEVICE — Device: Brand: BULB HOLDER

## (undated) DEVICE — 3M™ TEGADERM™ CHG DRESSING 25/CARTON 4 CARTONS/CASE 1657: Brand: TEGADERM™

## (undated) DEVICE — GARMENT,MEDLINE,DVT,INT,CALF,MED, GEN2: Brand: MEDLINE

## (undated) DEVICE — KIT EXP NDL L8IN SOAK CATH L10IN T PEEL ON-Q PAINBUSTER

## (undated) DEVICE — 9165 UNIVERSAL PATIENT PLATE: Brand: 3M™

## (undated) DEVICE — SYRINGE, LUER LOCK, 10ML: Brand: MEDLINE

## (undated) DEVICE — SKIN PREP TRAY W/CHG: Brand: MEDLINE INDUSTRIES, INC.

## (undated) DEVICE — PLASMABLADE PS210-030S 3.0S LOCK: Brand: PLASMABLADE™

## (undated) DEVICE — ADHESIVE SKIN CLSR 0.7ML TOP DERMBND ADV

## (undated) DEVICE — DRESSING TRNSPAR W4XL10IN FLM MIC POR SURESITE 123

## (undated) DEVICE — KIT INFUS PMP 270ML 4ML/HR 2ML/SITE SOAK CATH L5IN N NARC

## (undated) DEVICE — PREP-RESISTANT MARKER W/ RULER: Brand: MEDLINE INDUSTRIES, INC.

## (undated) DEVICE — DRAIN SURG 15FR SIL RND CHN W/ TRCR FULL FLUT DBL WRP TRAD

## (undated) DEVICE — PROBE COVER KIT: Brand: MEDLINE INDUSTRIES, INC.

## (undated) DEVICE — SUTURE VCRL SZ 3-0 L27IN ABSRB UD L26MM SH 1/2 CIR J416H

## (undated) DEVICE — STOCKINETTE,DOUBLE PLY,6X48,STERILE: Brand: MEDLINE

## (undated) DEVICE — GOWN,AURORA,NONRNF,XL,30/CS: Brand: MEDLINE

## (undated) DEVICE — TOWEL,OR,DSP,ST,BLUE,DLX,XR,4/PK,20PK/CS: Brand: MEDLINE

## (undated) DEVICE — MASTISOL ADHESIVE LIQ 2/3ML

## (undated) DEVICE — DRESSING TRNSPAR W5XL4.5IN FLM SHT SEMIPERMEABLE WIND

## (undated) DEVICE — Device

## (undated) DEVICE — SUTURE VCRL SZ 2-0 L54IN ABSRB UD LIGAPAK REEL NDL J286G

## (undated) DEVICE — RESERVOIR,SUCTION,100CC,SILICONE: Brand: MEDLINE

## (undated) DEVICE — DRAPE IRRIG FLD WRM W44XL44IN W/ AORN STD PRTBL INTRATEMP

## (undated) DEVICE — STRIP,CLOSURE,WOUND,MEDI-STRIP,1/2X4: Brand: MEDLINE

## (undated) DEVICE — GAUZE,PACKING STRIP,PLAIN,1"X5YD,STRL,LF: Brand: CURAD

## (undated) DEVICE — YANKAUER,FLEXIBLE HANDLE,REGLR CAPACITY: Brand: MEDLINE INDUSTRIES, INC.

## (undated) DEVICE — GLOVE SURG SZ 65 L12IN FNGR THK87MIL WHT LTX FREE

## (undated) DEVICE — GLOVE SURG SZ 7 L12IN FNGR THK79MIL GRN LTX FREE